# Patient Record
Sex: FEMALE | Race: BLACK OR AFRICAN AMERICAN | NOT HISPANIC OR LATINO | Employment: UNEMPLOYED | URBAN - METROPOLITAN AREA
[De-identification: names, ages, dates, MRNs, and addresses within clinical notes are randomized per-mention and may not be internally consistent; named-entity substitution may affect disease eponyms.]

---

## 2017-04-17 ENCOUNTER — HOSPITAL ENCOUNTER (EMERGENCY)
Facility: HOSPITAL | Age: 37
Discharge: PSYCHIATRIC OR SUBSTANCE USE FACILITY | End: 2017-04-18
Attending: EMERGENCY MEDICINE
Payer: MEDICAID

## 2017-04-17 DIAGNOSIS — N39.0 URINARY TRACT INFECTION WITHOUT HEMATURIA, SITE UNSPECIFIED: ICD-10-CM

## 2017-04-17 DIAGNOSIS — F29 PSYCHOSIS, UNSPECIFIED PSYCHOSIS TYPE: Primary | ICD-10-CM

## 2017-04-17 LAB
ALBUMIN SERPL BCP-MCNC: 4.4 G/DL
ALP SERPL-CCNC: 91 U/L
ALT SERPL W/O P-5'-P-CCNC: 10 U/L
AMORPH CRY URNS QL MICRO: ABNORMAL
AMPHET+METHAMPHET UR QL: NEGATIVE
ANION GAP SERPL CALC-SCNC: 16 MMOL/L
APAP SERPL-MCNC: <3 UG/ML
AST SERPL-CCNC: 14 U/L
B-HCG UR QL: NEGATIVE
BACTERIA #/AREA URNS HPF: ABNORMAL /HPF
BARBITURATES UR QL SCN>200 NG/ML: NEGATIVE
BASOPHILS # BLD AUTO: 0.01 K/UL
BASOPHILS NFR BLD: 0.1 %
BENZODIAZ UR QL SCN>200 NG/ML: NEGATIVE
BILIRUB SERPL-MCNC: 2.3 MG/DL
BILIRUB UR QL STRIP: NEGATIVE
BUN SERPL-MCNC: 7 MG/DL
BZE UR QL SCN: NEGATIVE
CALCIUM SERPL-MCNC: 10.2 MG/DL
CANNABINOIDS UR QL SCN: NORMAL
CHLORIDE SERPL-SCNC: 103 MMOL/L
CLARITY UR: ABNORMAL
CO2 SERPL-SCNC: 18 MMOL/L
COLOR UR: ABNORMAL
CREAT SERPL-MCNC: 0.9 MG/DL
CREAT UR-MCNC: 297.8 MG/DL
CTP QC/QA: YES
DIFFERENTIAL METHOD: ABNORMAL
EOSINOPHIL # BLD AUTO: 0 K/UL
EOSINOPHIL NFR BLD: 0 %
ERYTHROCYTE [DISTWIDTH] IN BLOOD BY AUTOMATED COUNT: 17.1 %
EST. GFR  (AFRICAN AMERICAN): >60 ML/MIN/1.73 M^2
EST. GFR  (NON AFRICAN AMERICAN): >60 ML/MIN/1.73 M^2
ETHANOL SERPL-MCNC: <10 MG/DL
GLUCOSE SERPL-MCNC: 141 MG/DL
GLUCOSE UR QL STRIP: NEGATIVE
GRAN CASTS #/AREA URNS LPF: 2 /LPF
HCT VFR BLD AUTO: 39.5 %
HGB BLD-MCNC: 13.9 G/DL
HGB UR QL STRIP: ABNORMAL
HYALINE CASTS #/AREA URNS LPF: 1 /LPF
KETONES UR QL STRIP: NEGATIVE
LEUKOCYTE ESTERASE UR QL STRIP: NEGATIVE
LYMPHOCYTES # BLD AUTO: 1.6 K/UL
LYMPHOCYTES NFR BLD: 12.7 %
MCH RBC QN AUTO: 32.8 PG
MCHC RBC AUTO-ENTMCNC: 35.2 %
MCV RBC AUTO: 93 FL
METHADONE UR QL SCN>300 NG/ML: NEGATIVE
MICROSCOPIC COMMENT: ABNORMAL
MONOCYTES # BLD AUTO: 1 K/UL
MONOCYTES NFR BLD: 8.2 %
NEUTROPHILS # BLD AUTO: 10 K/UL
NEUTROPHILS NFR BLD: 78.8 %
NITRITE UR QL STRIP: POSITIVE
OPIATES UR QL SCN: NEGATIVE
PCP UR QL SCN>25 NG/ML: NEGATIVE
PH UR STRIP: 6 [PH] (ref 5–8)
PLATELET # BLD AUTO: 359 K/UL
PMV BLD AUTO: 9.3 FL
POTASSIUM SERPL-SCNC: 4 MMOL/L
PROT SERPL-MCNC: 8.3 G/DL
PROT UR QL STRIP: ABNORMAL
RBC # BLD AUTO: 4.24 M/UL
RBC #/AREA URNS HPF: 10 /HPF (ref 0–4)
SALICYLATES SERPL-MCNC: <5 MG/DL
SODIUM SERPL-SCNC: 137 MMOL/L
SP GR UR STRIP: >=1.03 (ref 1–1.03)
TOXICOLOGY INFORMATION: NORMAL
URN SPEC COLLECT METH UR: ABNORMAL
UROBILINOGEN UR STRIP-ACNC: NEGATIVE EU/DL
WBC # BLD AUTO: 12.73 K/UL
WBC #/AREA URNS HPF: 5 /HPF (ref 0–5)

## 2017-04-17 PROCEDURE — 80320 DRUG SCREEN QUANTALCOHOLS: CPT

## 2017-04-17 PROCEDURE — 81000 URINALYSIS NONAUTO W/SCOPE: CPT

## 2017-04-17 PROCEDURE — 87088 URINE BACTERIA CULTURE: CPT

## 2017-04-17 PROCEDURE — 81025 URINE PREGNANCY TEST: CPT | Performed by: EMERGENCY MEDICINE

## 2017-04-17 PROCEDURE — 87086 URINE CULTURE/COLONY COUNT: CPT

## 2017-04-17 PROCEDURE — 85025 COMPLETE CBC W/AUTO DIFF WBC: CPT

## 2017-04-17 PROCEDURE — 96372 THER/PROPH/DIAG INJ SC/IM: CPT

## 2017-04-17 PROCEDURE — 99285 EMERGENCY DEPT VISIT HI MDM: CPT | Mod: 25

## 2017-04-17 PROCEDURE — 80329 ANALGESICS NON-OPIOID 1 OR 2: CPT

## 2017-04-17 PROCEDURE — 80307 DRUG TEST PRSMV CHEM ANLYZR: CPT

## 2017-04-17 PROCEDURE — 87186 SC STD MICRODIL/AGAR DIL: CPT

## 2017-04-17 PROCEDURE — 87077 CULTURE AEROBIC IDENTIFY: CPT

## 2017-04-17 PROCEDURE — 80053 COMPREHEN METABOLIC PANEL: CPT

## 2017-04-17 PROCEDURE — 80307 DRUG TEST PRSMV CHEM ANLYZR: CPT | Mod: 59

## 2017-04-17 PROCEDURE — 82570 ASSAY OF URINE CREATININE: CPT

## 2017-04-17 PROCEDURE — 63600175 PHARM REV CODE 636 W HCPCS: Performed by: EMERGENCY MEDICINE

## 2017-04-17 RX ORDER — DIPHENHYDRAMINE HYDROCHLORIDE 50 MG/ML
50 INJECTION INTRAMUSCULAR; INTRAVENOUS
Status: COMPLETED | OUTPATIENT
Start: 2017-04-17 | End: 2017-04-17

## 2017-04-17 RX ORDER — HALOPERIDOL 5 MG/ML
10 INJECTION INTRAMUSCULAR
Status: COMPLETED | OUTPATIENT
Start: 2017-04-17 | End: 2017-04-17

## 2017-04-17 RX ORDER — LORAZEPAM 2 MG/ML
2 INJECTION INTRAMUSCULAR
Status: COMPLETED | OUTPATIENT
Start: 2017-04-17 | End: 2017-04-17

## 2017-04-17 RX ADMIN — HALOPERIDOL LACTATE 10 MG: 5 INJECTION, SOLUTION INTRAMUSCULAR at 08:04

## 2017-04-17 RX ADMIN — LORAZEPAM 2 MG: 2 INJECTION, SOLUTION INTRAMUSCULAR; INTRAVENOUS at 08:04

## 2017-04-17 RX ADMIN — DIPHENHYDRAMINE HYDROCHLORIDE 50 MG: 50 INJECTION, SOLUTION INTRAMUSCULAR; INTRAVENOUS at 08:04

## 2017-04-18 VITALS
RESPIRATION RATE: 20 BRPM | BODY MASS INDEX: 23.9 KG/M2 | WEIGHT: 140 LBS | OXYGEN SATURATION: 98 % | SYSTOLIC BLOOD PRESSURE: 117 MMHG | HEIGHT: 64 IN | TEMPERATURE: 99 F | DIASTOLIC BLOOD PRESSURE: 72 MMHG | HEART RATE: 69 BPM

## 2017-04-18 RX ORDER — SULFAMETHOXAZOLE AND TRIMETHOPRIM 800; 160 MG/1; MG/1
1 TABLET ORAL 2 TIMES DAILY
Status: DISCONTINUED | OUTPATIENT
Start: 2017-04-18 | End: 2017-04-18 | Stop reason: HOSPADM

## 2017-04-18 NOTE — ED NOTES
Contacted [facility] to find out ETA.  is in route to  and transfer pt. to [facility].

## 2017-04-18 NOTE — ED NOTES
Called pt's mother, Nelly, to update family member on pt's psych placement into [facility] in [place]. No answer. Did not leave voicemail. Will attempt to contact family again in 30 minutes.

## 2017-04-18 NOTE — ED PROVIDER NOTES
"Encounter Date: 4/17/2017       History     Chief Complaint   Patient presents with    Mental Health Problem     reports "I'm going crazy" crying and screaming all day, reports "I was in mental hosptial before     Review of patient's allergies indicates:  No Known Allergies  HPI Comments: Patient is a 36-year-old female with a psychiatric history who presents to the ED saying she is going crazy has been screaming all day.  She denies illicit drug abuse.  No suicidal ideation.  Patient was recently treated as an inpatient in a psychiatric facility in [place].    The history is provided by the patient.     No past medical history on file.  Past Surgical History:   Procedure Laterality Date    BARTHOLIN GLAND CYST EXCISION       No family history on file.  Social History   Substance Use Topics    Smoking status: Current Some Day Smoker     Packs/day: 0.50     Types: Cigarettes    Smokeless tobacco: Not on file    Alcohol use 0.5 oz/week     1 drink(s) per week      Comment: now and then     Review of Systems   Constitutional: Negative for fever.   Respiratory: Negative for shortness of breath.    Gastrointestinal: Negative for abdominal pain and vomiting.   Psychiatric/Behavioral: Negative for hallucinations and suicidal ideas.   All other systems reviewed and are negative.      Physical Exam   Initial Vitals   BP Pulse Resp Temp SpO2   04/17/17 2029 04/17/17 2029 04/17/17 2029 04/17/17 2029 04/17/17 2029   138/69 113 20 98.7 °F (37.1 °C) 99 %     Physical Exam    Nursing note and vitals reviewed.  Constitutional: She appears distressed.   HENT:   Head: Normocephalic and atraumatic.   Eyes: EOM are normal.   Neck: Normal range of motion. Neck supple.   Cardiovascular: Normal rate, regular rhythm and normal heart sounds.   Pulmonary/Chest: Breath sounds normal.   Abdominal: Soft. There is no tenderness.   Musculoskeletal: Normal range of motion. She exhibits no edema.   Neurological: She is alert.   Skin: Skin " is warm and dry.   Psychiatric: Her mood appears anxious. She is agitated and aggressive.         ED Course   Procedures  Labs Reviewed   URINALYSIS - Abnormal; Notable for the following:        Result Value    Color, UA Orange (*)     Appearance, UA Hazy (*)     Specific Gravity, UA >=1.030 (*)     Protein, UA 2+ (*)     Occult Blood UA 3+ (*)     Nitrite, UA Positive (*)     All other components within normal limits   CBC W/ AUTO DIFFERENTIAL - Abnormal; Notable for the following:     WBC 12.73 (*)     MCH 32.8 (*)     RDW 17.1 (*)     Platelets 359 (*)     Gran # 10.0 (*)     Gran% 78.8 (*)     Lymph% 12.7 (*)     All other components within normal limits   COMPREHENSIVE METABOLIC PANEL - Abnormal; Notable for the following:     CO2 18 (*)     Glucose 141 (*)     Total Bilirubin 2.3 (*)     All other components within normal limits   ACETAMINOPHEN LEVEL - Abnormal; Notable for the following:     Acetaminophen (Tylenol), Serum <3.0 (*)     All other components within normal limits   SALICYLATE LEVEL - Abnormal; Notable for the following:     Salicylate Lvl <5.0 (*)     All other components within normal limits   URINALYSIS MICROSCOPIC - Abnormal; Notable for the following:     RBC, UA 10 (*)     Bacteria, UA Many (*)     Granular Casts, UA 2 (*)     All other components within normal limits   CULTURE, URINE   ALCOHOL,MEDICAL (ETHANOL)   DRUG SCREEN PANEL, URINE EMERGENCY   POCT URINE PREGNANCY                               ED Course     Clinical Impression:   The primary encounter diagnosis was Psychosis, unspecified psychosis type. A diagnosis of Urinary tract infection without hematuria, site unspecified was also pertinent to this visit.          Augie Ruvalcaba MD  04/18/17 3674

## 2017-04-18 NOTE — ED NOTES
"Pt. wheeled to ED room. Pt. presents to the ED saying she is going crazy and has been screaming all day. Previous psych treatment 6 months ago in [place]. Pt. screaming she wants to kill herself. "I'm going crazy". "help". Pt. appears fearful and paranoid. Mother reports pt. was hitting her head against the wall PTA. No obvious discoloration, bruising or swelling. Denies LOC. Denies HA and neck pain. AAO. Responding to internal stimuli. Admits to SI. Denies HI.   "

## 2017-04-18 NOTE — ED NOTES
Mother at bedside. Pt. calm, tearful, no longer yelling or disturbing the ED. 1-1 observation by ED tech Sharon. Pt. visible from nurses station. NAD.

## 2017-04-18 NOTE — ED NOTES
Pt wheeled directly to ED room. 1-1 observation assigned to pt. ED tech Sharon sitting with pt. Pt. placed in paper scrubs, socks and searched for harmful belongings. Belongings placed into pt. belonging bag by OC Yvette. Belongings locked in ED closet by LAURA Crawford.

## 2017-04-20 LAB — BACTERIA UR CULT: NORMAL

## 2017-05-05 NOTE — PROVIDER PROGRESS NOTES - EMERGENCY DEPT.
Encounter Date: 4/17/2017    ED Physician Progress Notes        Physician Note:   04/27/17 1837: Patient was not placed on ABX for UTI.  Call made to number listed with no answer.  EMMA        5/1/17 certified letter sent. NORIS

## 2017-11-13 ENCOUNTER — HOSPITAL ENCOUNTER (EMERGENCY)
Facility: HOSPITAL | Age: 37
Discharge: HOME | End: 2017-11-13
Attending: FAMILY MEDICINE
Payer: MEDICAID

## 2017-11-13 VITALS
SYSTOLIC BLOOD PRESSURE: 167 MMHG | HEART RATE: 87 BPM | RESPIRATION RATE: 18 BRPM | DIASTOLIC BLOOD PRESSURE: 92 MMHG | WEIGHT: 120 LBS | TEMPERATURE: 98 F | HEIGHT: 62 IN | BODY MASS INDEX: 22.08 KG/M2 | OXYGEN SATURATION: 100 %

## 2017-11-13 DIAGNOSIS — F41.9 ANXIETY: Primary | ICD-10-CM

## 2017-11-13 PROCEDURE — 99283 EMERGENCY DEPT VISIT LOW MDM: CPT

## 2017-11-13 RX ORDER — HYDROXYZINE HYDROCHLORIDE 25 MG/1
25 TABLET, FILM COATED ORAL EVERY 6 HOURS
Qty: 12 TABLET | Refills: 0 | Status: SHIPPED | OUTPATIENT
Start: 2017-11-13 | End: 2019-03-02

## 2017-11-14 NOTE — ED PROVIDER NOTES
Encounter Date: 2017       History     Chief Complaint   Patient presents with    Anxiety     Pt states has been anxious and upset since cousin  yesterday.  c/o + ETOH use today, denies SI/HI.      37-year-old female presents with chief complaint of anxiety.  Patient states is upset after her cousin  on yesterday.  Reports that she sometimes feels overwhelmed because her family in [facility] heavily any have been even more so after the death of her cousin.  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  Denies any suicidal homicidal thoughts.  States had trouble sleeping last night and would like something to help her sleep.  Does report has not been taking her anxiety medicine because she has not seen a counselor.  Does report just got on Medicaid again so desires to start seeing her counselor again.  Denies any fever or chills denies any nausea vomiting denies any shortness of breath at present.  Does admit to drinking 64 ounces of Hinojosa light this evening.          Review of patient's allergies indicates:  No Known Allergies  History reviewed. No pertinent past medical history.  Past Surgical History:   Procedure Laterality Date    BARTHOLIN GLAND CYST EXCISION       History reviewed. No pertinent family history.  Social History   Substance Use Topics    Smoking status: Current Some Day Smoker     Packs/day: 0.50     Types: Cigarettes    Smokeless tobacco: Not on file    Alcohol use 0.5 oz/week     1 drink(s) per week      Comment: now and then     Review of Systems   Psychiatric/Behavioral: Negative for agitation.   All other systems reviewed and are negative.      Physical Exam     Initial Vitals [17 1754]   BP Pulse Resp Temp SpO2   (!) 147/97 77 18 97.8 °F (36.6 °C) 100 %      MAP       113.67         Physical Exam    Nursing note and vitals reviewed.  Constitutional: She appears well-developed and well-nourished.   HENT:   Head:  Normocephalic and atraumatic.   Eyes: EOM are normal. Pupils are equal, round, and reactive to light.   Neck: Normal range of motion. Neck supple.   Cardiovascular: Normal rate, regular rhythm and normal heart sounds.   Pulmonary/Chest: Breath sounds normal.   Abdominal: Soft. Bowel sounds are normal.   Musculoskeletal: Normal range of motion.   Neurological: She is alert and oriented to person, place, and time.   Skin: Skin is warm. Capillary refill takes less than 2 seconds. There is erythema.   Psychiatric: Her behavior is normal. Her mood appears anxious. Her affect is not blunt and not labile. She does not exhibit a depressed mood. She expresses no homicidal and no suicidal ideation.         ED Course   Procedures  Labs Reviewed - No data to display                            ED Course      Clinical Impression:   The encounter diagnosis was Anxiety.                           Edward Cummings MD  11/13/17 8116

## 2018-11-17 ENCOUNTER — TELEMEDICINE (OUTPATIENT)
Dept: PSYCHIATRY | Facility: HOSPITAL | Age: 38
End: 2018-11-17

## 2018-11-17 ENCOUNTER — HOSPITAL ENCOUNTER (EMERGENCY)
Facility: HOSPITAL | Age: 38
Discharge: HOME | End: 2018-11-17
Attending: FAMILY MEDICINE
Payer: MEDICAID

## 2018-11-17 VITALS
HEIGHT: 63 IN | DIASTOLIC BLOOD PRESSURE: 88 MMHG | WEIGHT: 120 LBS | HEART RATE: 89 BPM | OXYGEN SATURATION: 100 % | SYSTOLIC BLOOD PRESSURE: 184 MMHG | TEMPERATURE: 98 F | RESPIRATION RATE: 20 BRPM | BODY MASS INDEX: 21.26 KG/M2

## 2018-11-17 DIAGNOSIS — R46.89 BEHAVIOR CONCERN: Primary | ICD-10-CM

## 2018-11-17 LAB
ALBUMIN SERPL BCP-MCNC: 4.5 G/DL
ALP SERPL-CCNC: 86 U/L
ALT SERPL W/O P-5'-P-CCNC: 19 U/L
AMPHET+METHAMPHET UR QL: NORMAL
ANION GAP SERPL CALC-SCNC: 10 MMOL/L
APAP SERPL-MCNC: <10 UG/ML
AST SERPL-CCNC: 31 U/L
B-HCG UR QL: NEGATIVE
BACTERIA #/AREA URNS AUTO: ABNORMAL /HPF
BARBITURATES UR QL SCN>200 NG/ML: NEGATIVE
BASOPHILS # BLD AUTO: 0.03 K/UL
BASOPHILS NFR BLD: 0.4 %
BENZODIAZ UR QL SCN>200 NG/ML: NEGATIVE
BILIRUB SERPL-MCNC: 2.4 MG/DL
BILIRUB UR QL STRIP: NEGATIVE
BUN SERPL-MCNC: 8 MG/DL
BZE UR QL SCN: NEGATIVE
CALCIUM SERPL-MCNC: 9.5 MG/DL
CANNABINOIDS UR QL SCN: NORMAL
CHLORIDE SERPL-SCNC: 103 MMOL/L
CLARITY UR REFRACT.AUTO: ABNORMAL
CO2 SERPL-SCNC: 22 MMOL/L
COLOR UR AUTO: ABNORMAL
CREAT SERPL-MCNC: 0.67 MG/DL
CREAT UR-MCNC: 324.8 MG/DL
DIFFERENTIAL METHOD: ABNORMAL
EOSINOPHIL # BLD AUTO: 0 K/UL
EOSINOPHIL NFR BLD: 0.1 %
ERYTHROCYTE [DISTWIDTH] IN BLOOD BY AUTOMATED COUNT: 15.9 %
EST. GFR  (AFRICAN AMERICAN): >60 ML/MIN/1.73 M^2
EST. GFR  (NON AFRICAN AMERICAN): >60 ML/MIN/1.73 M^2
ETHANOL SERPL-MCNC: <10 MG/DL
GLUCOSE SERPL-MCNC: 101 MG/DL
GLUCOSE UR QL STRIP: NEGATIVE
HCT VFR BLD AUTO: 37.4 %
HGB BLD-MCNC: 12.8 G/DL
HGB UR QL STRIP: ABNORMAL
HYALINE CASTS UR QL AUTO: 0 /LPF
KETONES UR QL STRIP: ABNORMAL
LEUKOCYTE ESTERASE UR QL STRIP: ABNORMAL
LYMPHOCYTES # BLD AUTO: 1.9 K/UL
LYMPHOCYTES NFR BLD: 26.5 %
MCH RBC QN AUTO: 31.7 PG
MCHC RBC AUTO-ENTMCNC: 34.2 G/DL
MCV RBC AUTO: 93 FL
METHADONE UR QL SCN>300 NG/ML: NEGATIVE
MICROSCOPIC COMMENT: ABNORMAL
MONOCYTES # BLD AUTO: 1.1 K/UL
MONOCYTES NFR BLD: 15.3 %
NEUTROPHILS # BLD AUTO: 4.2 K/UL
NEUTROPHILS NFR BLD: 57.6 %
NITRITE UR QL STRIP: NEGATIVE
OPIATES UR QL SCN: NEGATIVE
PCP UR QL SCN>25 NG/ML: NEGATIVE
PH UR STRIP: 8 [PH] (ref 5–8)
PLATELET # BLD AUTO: 305 K/UL
PMV BLD AUTO: 9.3 FL
POTASSIUM SERPL-SCNC: 3 MMOL/L
PROT SERPL-MCNC: 8 G/DL
PROT UR QL STRIP: ABNORMAL
RBC # BLD AUTO: 4.04 M/UL
RBC #/AREA URNS AUTO: 1 /HPF (ref 0–4)
SODIUM SERPL-SCNC: 135 MMOL/L
SP GR UR STRIP: 1.01 (ref 1–1.03)
TOXICOLOGY INFORMATION: NORMAL
TRI-PHOS CRY UR QL COMP ASSIST: ABNORMAL
URN SPEC COLLECT METH UR: ABNORMAL
UROBILINOGEN UR STRIP-ACNC: NEGATIVE EU/DL
WBC # BLD AUTO: 7.32 K/UL
WBC #/AREA URNS AUTO: 35 /HPF (ref 0–5)

## 2018-11-17 PROCEDURE — 87086 URINE CULTURE/COLONY COUNT: CPT

## 2018-11-17 PROCEDURE — 85025 COMPLETE CBC W/AUTO DIFF WBC: CPT

## 2018-11-17 PROCEDURE — 80329 ANALGESICS NON-OPIOID 1 OR 2: CPT

## 2018-11-17 PROCEDURE — 80320 DRUG SCREEN QUANTALCOHOLS: CPT

## 2018-11-17 PROCEDURE — 80053 COMPREHEN METABOLIC PANEL: CPT

## 2018-11-17 PROCEDURE — 25000003 PHARM REV CODE 250

## 2018-11-17 PROCEDURE — 99284 EMERGENCY DEPT VISIT MOD MDM: CPT

## 2018-11-17 PROCEDURE — 81000 URINALYSIS NONAUTO W/SCOPE: CPT | Mod: 59

## 2018-11-17 PROCEDURE — 80307 DRUG TEST PRSMV CHEM ANLYZR: CPT

## 2018-11-17 PROCEDURE — 25000003 PHARM REV CODE 250: Performed by: FAMILY MEDICINE

## 2018-11-17 PROCEDURE — 81025 URINE PREGNANCY TEST: CPT

## 2018-11-17 RX ORDER — POTASSIUM CHLORIDE 20 MEQ/1
40 TABLET, EXTENDED RELEASE ORAL
Status: COMPLETED | OUTPATIENT
Start: 2018-11-17 | End: 2018-11-17

## 2018-11-17 RX ORDER — RISPERIDONE 1 MG/1
TABLET ORAL
Status: COMPLETED
Start: 2018-11-17 | End: 2018-11-17

## 2018-11-17 RX ORDER — RISPERIDONE 1 MG/1
1 TABLET, ORALLY DISINTEGRATING ORAL
Status: DISCONTINUED | OUTPATIENT
Start: 2018-11-17 | End: 2018-11-17 | Stop reason: HOSPADM

## 2018-11-17 RX ORDER — RISPERIDONE 1 MG/1
1 TABLET ORAL 2 TIMES DAILY
Qty: 60 TABLET | Refills: 0 | Status: SHIPPED | OUTPATIENT
Start: 2018-11-17 | End: 2018-12-17

## 2018-11-17 RX ORDER — NITROFURANTOIN 25; 75 MG/1; MG/1
100 CAPSULE ORAL
Status: COMPLETED | OUTPATIENT
Start: 2018-11-17 | End: 2018-11-17

## 2018-11-17 RX ADMIN — NITROFURANTOIN (MONOHYDRATE/MACROCRYSTALS) 100 MG: 75; 25 CAPSULE ORAL at 08:11

## 2018-11-17 RX ADMIN — POTASSIUM CHLORIDE 40 MEQ: 20 TABLET, EXTENDED RELEASE ORAL at 08:11

## 2018-11-17 RX ADMIN — RISPERIDONE 1 MG: 1 TABLET ORAL at 08:11

## 2018-11-18 NOTE — ED PROVIDER NOTES
"Encounter Date: 11/17/2018       History     Chief Complaint   Patient presents with    Psychiatric Evaluation     pt was brought in by [facility] Police dept. Her sister called stating " My sister has not been taking her medications, she is hearing voices at the house she things some one is trying to get out of the atic and she hears a baby crying up there so today she ran down the street and asked a neibor to come and get them out" Pt denies all of this, she stated " i used to be on prescribed medications but not anymore."     38-year-old female patient comes in with complaint by the police department stating that she was a hearing voices.  Apparently patient states that she heard a child cry and was under the impression that was coming from her attic so she went and got a neighbor task to check patient states that she was not necessarily hearing voices that she thinks it could have even been a child outside but she states that her sister was concerned so the sister told the police that she hears voices and has hallucinations.  The patient herself call the police to come the checked the attic.  She is very cooperative soft-spoken does have some random flight of thoughts.          Review of patient's allergies indicates:  No Known Allergies  History reviewed. No pertinent past medical history.  Past Surgical History:   Procedure Laterality Date    BARTHOLIN GLAND CYST EXCISION       History reviewed. No pertinent family history.  Social History     Tobacco Use    Smoking status: Current Some Day Smoker     Packs/day: 0.50     Types: Cigarettes   Substance Use Topics    Alcohol use: Yes     Alcohol/week: 0.5 oz     Types: 1 Standard drinks or equivalent per week     Comment: drink about 4/5 cans a week    Drug use: No     Review of Systems   Constitutional: Negative for fever.   HENT: Negative for sore throat.    Respiratory: Negative for shortness of breath.    Cardiovascular: Negative for chest pain. "   Gastrointestinal: Negative for nausea.   Genitourinary: Negative for dysuria.   Musculoskeletal: Negative for back pain.   Skin: Negative for rash.   Neurological: Negative for weakness.   Hematological: Does not bruise/bleed easily.   Psychiatric/Behavioral: Positive for behavioral problems.   All other systems reviewed and are negative.      Physical Exam     Initial Vitals [11/17/18 1803]   BP Pulse Resp Temp SpO2   (!) 145/85 97 20 98.6 °F (37 °C) 100 %      MAP       --         Physical Exam    Nursing note and vitals reviewed.  Constitutional: She appears well-developed.   HENT:   Head: Normocephalic and atraumatic.   Right Ear: External ear normal.   Left Ear: External ear normal.   Nose: Nose normal.   Mouth/Throat: Oropharynx is clear and moist.   Eyes: Conjunctivae and EOM are normal. Pupils are equal, round, and reactive to light. Right eye exhibits no discharge. Left eye exhibits no discharge.   Neck: Normal range of motion. Neck supple. No tracheal deviation present.   Cardiovascular: Normal rate, regular rhythm and normal heart sounds.   No murmur heard.  Pulmonary/Chest: Breath sounds normal. No respiratory distress. She has no wheezes.   Abdominal: Soft. Bowel sounds are normal.   Neurological: She is alert and oriented to person, place, and time.   Skin: Skin is warm and dry.   Psychiatric:   No suicidal or homicidal thoughts         ED Course   Procedures  Labs Reviewed   CBC W/ AUTO DIFFERENTIAL - Abnormal; Notable for the following components:       Result Value    MCH 31.7 (*)     RDW 15.9 (*)     Mono # 1.1 (*)     Mono% 15.3 (*)     All other components within normal limits   COMPREHENSIVE METABOLIC PANEL - Abnormal; Notable for the following components:    Sodium 135 (*)     Potassium 3.0 (*)     CO2 22 (*)     Total Bilirubin 2.4 (*)     All other components within normal limits   URINALYSIS, REFLEX TO URINE CULTURE - Abnormal; Notable for the following components:    Color, UA Brown (*)      Appearance, UA Cloudy (*)     Protein, UA 1+ (*)     Ketones, UA 3+ (*)     Occult Blood UA Trace (*)     Leukocytes, UA 3+ (*)     All other components within normal limits    Narrative:     Preferred Collection Type->Urine, Clean Catch   URINALYSIS MICROSCOPIC - Abnormal; Notable for the following components:    WBC, UA 35 (*)     All other components within normal limits    Narrative:     Preferred Collection Type->Urine, Clean Catch   CULTURE, URINE   DRUG SCREEN PANEL, URINE EMERGENCY   ALCOHOL,MEDICAL (ETHANOL)   ACETAMINOPHEN LEVEL   PREGNANCY TEST, URINE RAPID          Imaging Results    None          Medical Decision Making:   Initial Assessment:   Patient sitting in no distress and pleasant. Patient has no other complaints other than documented.     Differential Diagnosis:   Suicide ideation  Schizophrenia  Depression  anxiety  '  ED Management:  Patient has a urinary tract infection which was addressed and 1st treatment given here in the ED otherwise patient is medically cleared for inpatient psychiatric treatment.                      Clinical Impression:   The encounter diagnosis was Behavior concern.                             Antonio Brown MD  11/22/18 4571
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
## 2018-11-20 LAB
BACTERIA UR CULT: NORMAL
BACTERIA UR CULT: NORMAL

## 2018-11-22 ENCOUNTER — HOSPITAL ENCOUNTER (EMERGENCY)
Facility: HOSPITAL | Age: 38
Discharge: HOME | End: 2018-11-22
Attending: FAMILY MEDICINE
Payer: MEDICAID

## 2018-11-22 VITALS
OXYGEN SATURATION: 99 % | SYSTOLIC BLOOD PRESSURE: 139 MMHG | TEMPERATURE: 99 F | BODY MASS INDEX: 23 KG/M2 | WEIGHT: 125 LBS | HEIGHT: 62 IN | RESPIRATION RATE: 20 BRPM | HEART RATE: 109 BPM | DIASTOLIC BLOOD PRESSURE: 82 MMHG

## 2018-11-22 DIAGNOSIS — K08.89 PAIN, DENTAL: Primary | ICD-10-CM

## 2018-11-22 PROCEDURE — 63600175 PHARM REV CODE 636 W HCPCS: Performed by: FAMILY MEDICINE

## 2018-11-22 PROCEDURE — 99284 EMERGENCY DEPT VISIT MOD MDM: CPT | Mod: 25

## 2018-11-22 PROCEDURE — 96372 THER/PROPH/DIAG INJ SC/IM: CPT

## 2018-11-22 RX ORDER — KETOROLAC TROMETHAMINE 30 MG/ML
60 INJECTION, SOLUTION INTRAMUSCULAR; INTRAVENOUS
Status: COMPLETED | OUTPATIENT
Start: 2018-11-22 | End: 2018-11-22

## 2018-11-22 RX ORDER — AMOXICILLIN 500 MG/1
500 CAPSULE ORAL 3 TIMES DAILY
Qty: 21 CAPSULE | Refills: 0 | Status: SHIPPED | OUTPATIENT
Start: 2018-11-22 | End: 2018-11-29

## 2018-11-22 RX ORDER — DICLOFENAC SODIUM 50 MG/1
50 TABLET, DELAYED RELEASE ORAL 2 TIMES DAILY
Qty: 21 TABLET | Refills: 0 | Status: SHIPPED | OUTPATIENT
Start: 2018-11-22 | End: 2019-03-02

## 2018-11-22 RX ADMIN — KETOROLAC TROMETHAMINE 60 MG: 30 INJECTION, SOLUTION INTRAMUSCULAR at 10:11

## 2018-11-22 NOTE — ED PROVIDER NOTES
Encounter Date: 11/22/2018       History     Chief Complaint   Patient presents with    Dental Pain     Pt c/o dental pain to left side of face since yesterday, states + swelling to left side of face.      38-year-old female comes in with complaint of right-sided lower dental pain had the same problem month ago was seen in urgent care but did not follow up with the dentist otherwise patient has some cheek swelling consistent with a dental abscess.          Review of patient's allergies indicates:  No Known Allergies  History reviewed. No pertinent past medical history.  Past Surgical History:   Procedure Laterality Date    BARTHOLIN GLAND CYST EXCISION       History reviewed. No pertinent family history.  Social History     Tobacco Use    Smoking status: Current Some Day Smoker     Packs/day: 0.50     Types: Cigarettes   Substance Use Topics    Alcohol use: Yes     Alcohol/week: 0.5 oz     Types: 1 Standard drinks or equivalent per week     Comment: drink about 4/5 cans a week    Drug use: No     Review of Systems   Constitutional: Negative for fever.   HENT: Positive for dental problem. Negative for sore throat.    Respiratory: Negative for shortness of breath.    Cardiovascular: Negative for chest pain.   Gastrointestinal: Negative for nausea.   Genitourinary: Negative for dysuria.   Musculoskeletal: Negative for back pain.   Skin: Negative for rash.   Neurological: Negative for weakness.   Hematological: Does not bruise/bleed easily.   All other systems reviewed and are negative.      Physical Exam     Initial Vitals [11/22/18 0932]   BP Pulse Resp Temp SpO2   139/82 109 20 99 °F (37.2 °C) 99 %      MAP       --         Physical Exam    Nursing note and vitals reviewed.  Constitutional: She appears well-developed.   HENT:   Head: Normocephalic and atraumatic.   Right Ear: External ear normal.   Left Ear: External ear normal.   Nose: Nose normal.   Mouth/Throat: Oropharynx is clear and moist.   Eyes:  Conjunctivae and EOM are normal. Pupils are equal, round, and reactive to light. Right eye exhibits no discharge. Left eye exhibits no discharge.   Neck: Normal range of motion. Neck supple. No tracheal deviation present.   Cardiovascular: Normal rate, regular rhythm and normal heart sounds.   No murmur heard.  Pulmonary/Chest: Breath sounds normal. No respiratory distress. She has no wheezes.   Abdominal: Soft. Bowel sounds are normal.   Neurological: She is alert and oriented to person, place, and time.   Skin: Skin is warm and dry.         ED Course   Procedures  Labs Reviewed - No data to display       Imaging Results    None          Medical Decision Making:   Initial Assessment:   Patient in moderate distress and no other complains    Differential Diagnosis:   Tooth abscess  Tooth carries  Infection  Gum disease                         Clinical Impression:   The encounter diagnosis was Pain, dental.                             Antonio Brown MD  11/22/18 8126

## 2019-03-02 ENCOUNTER — HOSPITAL ENCOUNTER (EMERGENCY)
Facility: HOSPITAL | Age: 39
Discharge: HOME | End: 2019-03-02
Attending: EMERGENCY MEDICINE
Payer: MEDICAID

## 2019-03-02 VITALS
WEIGHT: 130 LBS | HEART RATE: 109 BPM | SYSTOLIC BLOOD PRESSURE: 124 MMHG | TEMPERATURE: 99 F | HEIGHT: 62 IN | RESPIRATION RATE: 18 BRPM | BODY MASS INDEX: 23.92 KG/M2 | DIASTOLIC BLOOD PRESSURE: 67 MMHG | OXYGEN SATURATION: 100 %

## 2019-03-02 DIAGNOSIS — Z34.91 PREGNANT AND NOT YET DELIVERED IN FIRST TRIMESTER: ICD-10-CM

## 2019-03-02 DIAGNOSIS — L02.91 ABSCESS: Primary | ICD-10-CM

## 2019-03-02 LAB — B-HCG UR QL: POSITIVE

## 2019-03-02 PROCEDURE — 10060 I&D ABSCESS SIMPLE/SINGLE: CPT | Mod: ER

## 2019-03-02 PROCEDURE — 81025 URINE PREGNANCY TEST: CPT | Mod: ER

## 2019-03-02 PROCEDURE — 99283 EMERGENCY DEPT VISIT LOW MDM: CPT | Mod: 25,ER

## 2019-03-02 PROCEDURE — 25000003 PHARM REV CODE 250: Mod: ER | Performed by: PHYSICIAN ASSISTANT

## 2019-03-02 RX ORDER — LIDOCAINE HYDROCHLORIDE 10 MG/ML
5 INJECTION, SOLUTION EPIDURAL; INFILTRATION; INTRACAUDAL; PERINEURAL
Status: COMPLETED | OUTPATIENT
Start: 2019-03-02 | End: 2019-03-02

## 2019-03-02 RX ORDER — CEPHALEXIN 500 MG/1
500 CAPSULE ORAL 4 TIMES DAILY
Qty: 28 CAPSULE | Refills: 0 | Status: SHIPPED | OUTPATIENT
Start: 2019-03-02 | End: 2019-03-09

## 2019-03-02 RX ADMIN — LIDOCAINE HYDROCHLORIDE 50 MG: 10 INJECTION, SOLUTION EPIDURAL; INFILTRATION; INTRACAUDAL; PERINEURAL at 09:03

## 2019-03-03 NOTE — ED PROVIDER NOTES
Encounter Date: 3/2/2019       History     Chief Complaint   Patient presents with    Abscess     Pt with c/o abscess to L lower lower x2 days that got progressively worse. Pt denies fever.     Pt with c/o abscess to L lower leg x2 days getting progressively worse. Pt denies fever.             Review of patient's allergies indicates:  No Known Allergies  History reviewed. No pertinent past medical history.  Past Surgical History:   Procedure Laterality Date    BARTHOLIN GLAND CYST EXCISION       History reviewed. No pertinent family history.  Social History     Tobacco Use    Smoking status: Current Some Day Smoker     Packs/day: 0.50     Types: Cigarettes    Smokeless tobacco: Current User   Substance Use Topics    Alcohol use: Yes     Alcohol/week: 0.5 oz     Types: 1 Standard drinks or equivalent per week     Comment: drink about 4/5 cans a week    Drug use: No     Review of Systems   Constitutional: Negative for diaphoresis, fatigue and fever.        14 Point ROS completed and negative with the exception of HPI and Below.   HENT: Negative for congestion, ear pain and sore throat.    Eyes: Negative for discharge and itching.   Respiratory: Negative for cough, chest tightness, shortness of breath and wheezing.    Cardiovascular: Negative for chest pain, palpitations and leg swelling.   Gastrointestinal: Negative for abdominal distention, abdominal pain, nausea and vomiting.   Genitourinary: Negative for dysuria, flank pain and frequency.   Musculoskeletal: Negative for back pain, neck pain and neck stiffness.   Skin: Negative for pallor and rash.        Cellulitis and abscess left lower extremity   Neurological: Negative for dizziness, syncope, facial asymmetry, weakness and headaches.   Hematological: Does not bruise/bleed easily.   Psychiatric/Behavioral: Negative for agitation, behavioral problems and confusion.   All other systems reviewed and are negative.      Physical Exam     Initial Vitals [03/02/19  2122]   BP Pulse Resp Temp SpO2   124/67 109 18 98.9 °F (37.2 °C) 100 %      MAP       --         Physical Exam    Constitutional: She appears well-developed and well-nourished.   HENT:   Head: Normocephalic and atraumatic.   Right Ear: External ear normal.   Left Ear: External ear normal.   Nose: Nose normal.   Mouth/Throat: Oropharynx is clear and moist.   Eyes: Conjunctivae and EOM are normal. Pupils are equal, round, and reactive to light. Right eye exhibits no discharge. Left eye exhibits no discharge. No scleral icterus.   Neck: Normal range of motion. Neck supple. No thyromegaly present. No tracheal deviation present. No JVD present.   Cardiovascular: Normal rate, regular rhythm, normal heart sounds and intact distal pulses. Exam reveals no gallop and no friction rub.    No murmur heard.  Pulmonary/Chest: Breath sounds normal. No stridor. No respiratory distress. She has no wheezes. She has no rhonchi. She has no rales. She exhibits no tenderness.   Abdominal: Soft. Bowel sounds are normal. She exhibits no distension and no mass. There is no tenderness. There is no rebound and no guarding.   Musculoskeletal: Normal range of motion. She exhibits no edema or tenderness.   Lymphadenopathy:     She has no cervical adenopathy.   Neurological: She is alert and oriented to person, place, and time. She has normal strength and normal reflexes. She displays normal reflexes. No cranial nerve deficit or sensory deficit.   Skin: Skin is warm and dry. Capillary refill takes less than 2 seconds. Abscess noted. No rash noted. There is erythema. No pallor.        On exam the left lower leg has a 2 in area raised fluctuant erythema abscess tender to palpation   Psychiatric: She has a normal mood and affect. Her behavior is normal. Thought content normal.         ED Course   I & D - Incision and Drainage  Date/Time: 3/2/2019 9:52 PM  Performed by: Hasmukh Peterson PA-C  Authorized by: Bari Humphries MD   Pre-operative  "diagnosis: Abscess  Post-operative diagnosis: Abscess  Consent Done: Yes  Consent: Verbal consent obtained.  Risks and benefits: risks, benefits and alternatives were discussed  Consent given by: patient  Patient understanding: patient states understanding of the procedure being performed  Patient consent: the patient's understanding of the procedure matches consent given  Procedure consent: procedure consent matches procedure scheduled  Relevant documents: relevant documents present and verified  Test results: test results available and properly labeled  Site marked: the operative site was marked  Required items: required blood products, implants, devices, and special equipment available  Patient identity confirmed: , MRN, name, provided demographic data and verbally with patient  Time out: Immediately prior to procedure a "time out" was called to verify the correct patient, procedure, equipment, support staff and site/side marked as required.  Type: abscess  Body area: lower extremity  Location details: left leg  Anesthesia: local infiltration    Anesthesia:  Local Anesthetic: lidocaine 1% without epinephrine  Anesthetic total: 5 mL  Patient sedated: no  Description of findings: Serosanguineous,  discharge obtained   Incision type: single straight  Complexity: simple  Drainage: pus and  serosanguinous  Drainage amount: moderate  Wound treatment: incision and  wound packed  Packing material: 1/4 in gauze  Complications: No  Estimated blood loss (mL): 10  Specimens: No  Implants: No  Patient tolerance: Patient tolerated the procedure well with no immediate complications        Labs Reviewed - No data to display       Imaging Results    None          Medical Decision Making:   Initial Assessment:   Pt with c/o abscess to L lower leg x2 days getting progressively worse. Pt denies fever.  On exam the left lower leg has a 2 in area raised fluctuant erythema abscess tender to palpation  Differential Diagnosis: "   Abscess, cellulitis, MRSA                      Clinical Impression:       ICD-10-CM ICD-9-CM   1. Abscess L02.91 682.9   2. Pregnant and not yet delivered in first trimester Z34.91 V22.1                                Hasmukh Peterson PA-C  03/02/19 2200

## 2019-03-03 NOTE — DISCHARGE INSTRUCTIONS
Patient states she already has an appointment with her OB for a checkup.  She is to keep this appointment.  Continue all antibiotics and return to the ED for packing removal as instructed.

## 2019-10-07 ENCOUNTER — HOSPITAL ENCOUNTER (EMERGENCY)
Facility: HOSPITAL | Age: 39
Discharge: HOME | End: 2019-10-07
Attending: EMERGENCY MEDICINE
Payer: MEDICAID

## 2019-10-07 VITALS
HEIGHT: 64 IN | SYSTOLIC BLOOD PRESSURE: 137 MMHG | OXYGEN SATURATION: 99 % | BODY MASS INDEX: 23.05 KG/M2 | TEMPERATURE: 99 F | RESPIRATION RATE: 16 BRPM | DIASTOLIC BLOOD PRESSURE: 83 MMHG | HEART RATE: 81 BPM | WEIGHT: 135 LBS

## 2019-10-07 DIAGNOSIS — T16.1XXA FOREIGN BODY OF RIGHT EAR, INITIAL ENCOUNTER: Primary | ICD-10-CM

## 2019-10-07 DIAGNOSIS — H60.91 OTITIS EXTERNA OF RIGHT EAR, UNSPECIFIED CHRONICITY, UNSPECIFIED TYPE: ICD-10-CM

## 2019-10-07 PROCEDURE — 69200 CLEAR OUTER EAR CANAL: CPT | Mod: RT,ER

## 2019-10-07 PROCEDURE — 99283 EMERGENCY DEPT VISIT LOW MDM: CPT | Mod: 25,ER

## 2019-10-07 RX ORDER — NEOMYCIN SULFATE, POLYMYXIN B SULFATE AND HYDROCORTISONE 10; 3.5; 1 MG/ML; MG/ML; [USP'U]/ML
4 SUSPENSION/ DROPS AURICULAR (OTIC) 4 TIMES DAILY
Qty: 10 ML | Refills: 0 | Status: SHIPPED | OUTPATIENT
Start: 2019-10-07 | End: 2019-10-12

## 2019-10-08 NOTE — ED PROVIDER NOTES
Encounter Date: 10/7/2019       History     Chief Complaint   Patient presents with    Foreign Body in Ear     PT stated she has had a piece of Q-tip in right ear x2 weeks. No drainage coming from ear.      This patient is a 39-year-old female.  Presents to the emergency department complaining of a foreign body in the right ear canal, said that a Q-tip cotton in came off about 2 weeks ago.  It was 2 days before she went into the hospital to deliver her child.  She said that while she was in the hospital she told her obstetrician, but she said that no one took the piece of cotton out.  She is complaining of discomfort in the ear.  No fevers.  No drainage. Not diabetic.        Review of patient's allergies indicates:  No Known Allergies  No past medical history on file.  Past Surgical History:   Procedure Laterality Date    BARTHOLIN GLAND CYST EXCISION       No family history on file.  Social History     Tobacco Use    Smoking status: Current Some Day Smoker     Packs/day: 0.50     Types: Cigarettes    Smokeless tobacco: Current User   Substance Use Topics    Alcohol use: Yes     Alcohol/week: 0.8 standard drinks     Types: 1 Standard drinks or equivalent per week     Comment: drink about 4/5 cans a week    Drug use: No     Review of Systems   Constitutional: Negative.    HENT: Negative for ear discharge.    Respiratory: Negative.    Cardiovascular: Negative.    Gastrointestinal: Negative.    Endocrine: Negative.        Physical Exam     Initial Vitals [10/07/19 2214]   BP Pulse Resp Temp SpO2   137/83 81 16 98.5 °F (36.9 °C) 99 %      MAP       --         Physical Exam    Nursing note and vitals reviewed.  Constitutional: She appears well-developed and well-nourished. She is not diaphoretic. No distress.   HENT:   Head: Normocephalic.   Left Ear: External ear normal.   Mouth/Throat: Oropharynx is clear and moist.   Right external auditory canal has a piece of cotton Q-tip in the canal.  I removed it with a  alligator forceps, and re-examined the ear, tympanic membrane is intact.  There is some erythema of the posterior margin of the canal, looks like a small abrasion.  The patient said that she tried to remove the cotton tip with a forceps, so she may have abraded the ear canal.  There is no bleeding.   Neck: No JVD present.   Cardiovascular: Normal rate, regular rhythm, normal heart sounds and intact distal pulses.   Pulmonary/Chest: Breath sounds normal. No stridor. No respiratory distress.   Skin: Skin is warm and dry.   Psychiatric: She has a normal mood and affect.         ED Course   Foreign Body  Date/Time: 10/7/2019 10:36 PM  Performed by: Niall Harmon MD  Authorized by: Niall Harmon MD   Consent Done: Not Needed  Body area: ear    Anesthesia:  Local anesthesia used: no  Patient sedated: no  Localization method: visualized  Removal mechanism: alligator forceps  Complexity: simple  1 objects recovered.  Objects recovered: Cotton tip  Post-procedure assessment: foreign body removed  Patient tolerance: Patient tolerated the procedure well with no immediate complications      Labs Reviewed - No data to display       Imaging Results    None          Medical Decision Making:   Initial Assessment:   Foreign body, removed from right external auditory canal.  She has some erythema and inflammation of the posterior margin of the auditory canal, so I will place her on Cortisporin otic suspension for the next 5 days.  She should follow up with her primary care physician.                      Clinical Impression:       ICD-10-CM ICD-9-CM   1. Foreign body of right ear, initial encounter T16.1XXA 931     E915   2. Otitis externa of right ear, unspecified chronicity, unspecified type H60.91 380.10         Disposition:   Disposition: Discharged  Condition: Stable                        Niall Harmon MD  10/07/19 0929

## 2020-06-19 ENCOUNTER — HOSPITAL ENCOUNTER (OUTPATIENT)
Facility: HOSPITAL | Age: 40
Discharge: PSYCHIATRIC OR SUBSTANCE USE FACILITY | End: 2020-06-21
Attending: EMERGENCY MEDICINE | Admitting: INTERNAL MEDICINE
Payer: MEDICAID

## 2020-06-19 DIAGNOSIS — N30.00 ACUTE CYSTITIS WITHOUT HEMATURIA: ICD-10-CM

## 2020-06-19 DIAGNOSIS — Z00.8 MEDICAL CLEARANCE FOR PSYCHIATRIC ADMISSION: ICD-10-CM

## 2020-06-19 DIAGNOSIS — T14.91XA SUICIDE ATTEMPT: ICD-10-CM

## 2020-06-19 DIAGNOSIS — F32.3 CURRENT SEVERE EPISODE OF MAJOR DEPRESSIVE DISORDER WITH PSYCHOTIC FEATURES, UNSPECIFIED WHETHER RECURRENT: ICD-10-CM

## 2020-06-19 DIAGNOSIS — J69.0 ASPIRATION PNEUMONIA OF RIGHT LOWER LOBE, UNSPECIFIED ASPIRATION PNEUMONIA TYPE: ICD-10-CM

## 2020-06-19 DIAGNOSIS — F23 ACUTE PSYCHOSIS: ICD-10-CM

## 2020-06-19 DIAGNOSIS — E87.6 HYPOKALEMIA: ICD-10-CM
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
## 2020-06-19 DIAGNOSIS — J98.2 PNEUMOMEDIASTINUM: ICD-10-CM

## 2020-06-19 DIAGNOSIS — F31.9 BIPOLAR AFFECTIVE DISORDER, REMISSION STATUS UNSPECIFIED: ICD-10-CM

## 2020-06-19 DIAGNOSIS — J69.0 ASPIRATION PNEUMONITIS: ICD-10-CM

## 2020-06-19 DIAGNOSIS — S19.81XA: ICD-10-CM

## 2020-06-19 DIAGNOSIS — J38.4 SUPRAGLOTTIC EDEMA: ICD-10-CM

## 2020-06-19 LAB
BASOPHILS # BLD AUTO: 0.03 K/UL (ref 0–0.2)
BASOPHILS NFR BLD: 0.2 % (ref 0–1.9)
DIFFERENTIAL METHOD: ABNORMAL
EOSINOPHIL # BLD AUTO: 0 K/UL (ref 0–0.5)
EOSINOPHIL NFR BLD: 0 % (ref 0–8)
ERYTHROCYTE [DISTWIDTH] IN BLOOD BY AUTOMATED COUNT: 13.6 % (ref 11.5–14.5)
HCT VFR BLD AUTO: 40.9 % (ref 37–48.5)
HGB BLD-MCNC: 14.5 G/DL (ref 12–16)
IMM GRANULOCYTES # BLD AUTO: 0.06 K/UL (ref 0–0.04)
IMM GRANULOCYTES NFR BLD AUTO: 0.4 % (ref 0–0.5)
LYMPHOCYTES # BLD AUTO: 1.4 K/UL (ref 1–4.8)
LYMPHOCYTES NFR BLD: 8.5 % (ref 18–48)
MCH RBC QN AUTO: 34.5 PG (ref 27–31)
MCHC RBC AUTO-ENTMCNC: 35.5 G/DL (ref 32–36)
MCV RBC AUTO: 97 FL (ref 82–98)
MONOCYTES # BLD AUTO: 1.2 K/UL (ref 0.3–1)
MONOCYTES NFR BLD: 7.5 % (ref 4–15)
NEUTROPHILS # BLD AUTO: 13.5 K/UL (ref 1.8–7.7)
NEUTROPHILS NFR BLD: 83.4 % (ref 38–73)
NRBC BLD-RTO: 0 /100 WBC
PLATELET # BLD AUTO: 317 K/UL (ref 150–350)
PMV BLD AUTO: 9 FL (ref 9.2–12.9)
RBC # BLD AUTO: 4.2 M/UL (ref 4–5.4)
WBC # BLD AUTO: 16.21 K/UL (ref 3.9–12.7)

## 2020-06-19 PROCEDURE — 96372 THER/PROPH/DIAG INJ SC/IM: CPT | Mod: 59

## 2020-06-19 PROCEDURE — 80320 DRUG SCREEN QUANTALCOHOLS: CPT

## 2020-06-19 PROCEDURE — 80329 ANALGESICS NON-OPIOID 1 OR 2: CPT

## 2020-06-19 PROCEDURE — 80307 DRUG TEST PRSMV CHEM ANLYZR: CPT

## 2020-06-19 PROCEDURE — 80053 COMPREHEN METABOLIC PANEL: CPT

## 2020-06-19 PROCEDURE — 81025 URINE PREGNANCY TEST: CPT | Performed by: EMERGENCY MEDICINE

## 2020-06-19 PROCEDURE — 63600175 PHARM REV CODE 636 W HCPCS: Performed by: EMERGENCY MEDICINE

## 2020-06-19 PROCEDURE — 87086 URINE CULTURE/COLONY COUNT: CPT

## 2020-06-19 PROCEDURE — 81000 URINALYSIS NONAUTO W/SCOPE: CPT | Mod: 59

## 2020-06-19 PROCEDURE — 84443 ASSAY THYROID STIM HORMONE: CPT

## 2020-06-19 PROCEDURE — U0002 COVID-19 LAB TEST NON-CDC: HCPCS

## 2020-06-19 PROCEDURE — 99291 CRITICAL CARE FIRST HOUR: CPT | Mod: 25

## 2020-06-19 PROCEDURE — 85025 COMPLETE CBC W/AUTO DIFF WBC: CPT

## 2020-06-19 RX ORDER — DIPHENHYDRAMINE HYDROCHLORIDE 50 MG/ML
50 INJECTION INTRAMUSCULAR; INTRAVENOUS
Status: COMPLETED | OUTPATIENT
Start: 2020-06-19 | End: 2020-06-19

## 2020-06-19 RX ORDER — HALOPERIDOL 5 MG/ML
5 INJECTION INTRAMUSCULAR
Status: COMPLETED | OUTPATIENT
Start: 2020-06-19 | End: 2020-06-19

## 2020-06-19 RX ADMIN — LORAZEPAM 2 MG: 2 INJECTION INTRAMUSCULAR; INTRAVENOUS at 11:06

## 2020-06-19 RX ADMIN — DIPHENHYDRAMINE HYDROCHLORIDE 50 MG: 50 INJECTION, SOLUTION INTRAMUSCULAR; INTRAVENOUS at 11:06

## 2020-06-19 RX ADMIN — HALOPERIDOL LACTATE 5 MG: 5 INJECTION, SOLUTION INTRAMUSCULAR at 11:06

## 2020-06-20 PROBLEM — N39.0 UTI (URINARY TRACT INFECTION): Status: ACTIVE | Noted: 2020-06-20

## 2020-06-20 PROBLEM — J38.4 SUPRAGLOTTIC EDEMA: Status: ACTIVE | Noted: 2020-06-20

## 2020-06-20 PROBLEM — S19.81XA: Status: ACTIVE | Noted: 2020-06-20
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
## 2020-06-20 PROBLEM — J69.0 ASPIRATION PNEUMONIA: Status: ACTIVE | Noted: 2020-06-20

## 2020-06-20 PROBLEM — E87.6 HYPOKALEMIA: Status: ACTIVE | Noted: 2020-06-20

## 2020-06-20 PROBLEM — F31.9 BIPOLAR DISORDER: Status: ACTIVE | Noted: 2020-06-20

## 2020-06-20 PROBLEM — J98.2 PNEUMOMEDIASTINUM: Status: ACTIVE | Noted: 2020-06-20

## 2020-06-20 PROBLEM — T14.91XA SUICIDE ATTEMPT: Status: ACTIVE | Noted: 2020-06-20

## 2020-06-20 LAB
ALBUMIN SERPL BCP-MCNC: 4 G/DL (ref 3.5–5.2)
ALBUMIN SERPL BCP-MCNC: 4.1 G/DL (ref 3.5–5.2)
ALP SERPL-CCNC: 100 U/L (ref 55–135)
ALP SERPL-CCNC: 99 U/L (ref 55–135)
ALT SERPL W/O P-5'-P-CCNC: 10 U/L (ref 10–44)
ALT SERPL W/O P-5'-P-CCNC: 13 U/L (ref 10–44)
AMPHET+METHAMPHET UR QL: NEGATIVE
ANION GAP SERPL CALC-SCNC: 10 MMOL/L (ref 8–16)
ANION GAP SERPL CALC-SCNC: 12 MMOL/L (ref 8–16)
APAP SERPL-MCNC: <3 UG/ML (ref 10–20)
AST SERPL-CCNC: 24 U/L (ref 10–40)
AST SERPL-CCNC: 53 U/L (ref 10–40)
B-HCG UR QL: NEGATIVE
BACTERIA #/AREA URNS HPF: ABNORMAL /HPF
BARBITURATES UR QL SCN>200 NG/ML: NEGATIVE
BASOPHILS # BLD AUTO: 0.02 K/UL (ref 0–0.2)
BASOPHILS NFR BLD: 0.2 % (ref 0–1.9)
BENZODIAZ UR QL SCN>200 NG/ML: ABNORMAL
BILIRUB SERPL-MCNC: 1.4 MG/DL (ref 0.1–1)
BILIRUB SERPL-MCNC: 1.8 MG/DL (ref 0.1–1)
BILIRUB UR QL STRIP: ABNORMAL
BUN SERPL-MCNC: 7 MG/DL (ref 6–20)
BUN SERPL-MCNC: 8 MG/DL (ref 6–20)
BZE UR QL SCN: NEGATIVE
CALCIUM SERPL-MCNC: 9.2 MG/DL (ref 8.7–10.5)
CALCIUM SERPL-MCNC: 9.5 MG/DL (ref 8.7–10.5)
CANNABINOIDS UR QL SCN: ABNORMAL
CHLORIDE SERPL-SCNC: 107 MMOL/L (ref 95–110)
CHLORIDE SERPL-SCNC: 109 MMOL/L (ref 95–110)
CHOLEST SERPL-MCNC: 103 MG/DL (ref 120–199)
CHOLEST/HDLC SERPL: 1.8 {RATIO} (ref 2–5)
CLARITY UR: ABNORMAL
CO2 SERPL-SCNC: 15 MMOL/L (ref 23–29)
CO2 SERPL-SCNC: 21 MMOL/L (ref 23–29)
COLOR UR: YELLOW
CREAT SERPL-MCNC: 0.7 MG/DL (ref 0.5–1.4)
CREAT SERPL-MCNC: 0.7 MG/DL (ref 0.5–1.4)
CREAT UR-MCNC: 429.1 MG/DL (ref 15–325)
CTP QC/QA: YES
DIFFERENTIAL METHOD: ABNORMAL
EOSINOPHIL # BLD AUTO: 0 K/UL (ref 0–0.5)
EOSINOPHIL NFR BLD: 0.1 % (ref 0–8)
ERYTHROCYTE [DISTWIDTH] IN BLOOD BY AUTOMATED COUNT: 14.7 % (ref 11.5–14.5)
EST. GFR  (AFRICAN AMERICAN): >60 ML/MIN/1.73 M^2
EST. GFR  (AFRICAN AMERICAN): >60 ML/MIN/1.73 M^2
EST. GFR  (NON AFRICAN AMERICAN): >60 ML/MIN/1.73 M^2
EST. GFR  (NON AFRICAN AMERICAN): >60 ML/MIN/1.73 M^2
ESTIMATED AVG GLUCOSE: 88 MG/DL (ref 68–131)
ETHANOL SERPL-MCNC: <10 MG/DL
GLUCOSE SERPL-MCNC: 74 MG/DL (ref 70–110)
GLUCOSE SERPL-MCNC: 95 MG/DL (ref 70–110)
GLUCOSE UR QL STRIP: NEGATIVE
HBA1C MFR BLD HPLC: 4.7 % (ref 4–5.6)
HCT VFR BLD AUTO: 46.4 % (ref 37–48.5)
HDLC SERPL-MCNC: 56 MG/DL (ref 40–75)
HDLC SERPL: 54.4 % (ref 20–50)
HGB BLD-MCNC: 15 G/DL (ref 12–16)
HGB UR QL STRIP: ABNORMAL
HYALINE CASTS #/AREA URNS LPF: 0 /LPF
IMM GRANULOCYTES # BLD AUTO: 0.03 K/UL (ref 0–0.04)
IMM GRANULOCYTES NFR BLD AUTO: 0.3 % (ref 0–0.5)
KETONES UR QL STRIP: ABNORMAL
LDLC SERPL CALC-MCNC: 35 MG/DL (ref 63–159)
LEUKOCYTE ESTERASE UR QL STRIP: ABNORMAL
LYMPHOCYTES # BLD AUTO: 1 K/UL (ref 1–4.8)
LYMPHOCYTES NFR BLD: 9.9 % (ref 18–48)
MAGNESIUM SERPL-MCNC: 2.1 MG/DL (ref 1.6–2.6)
MCH RBC QN AUTO: 34.2 PG (ref 27–31)
MCHC RBC AUTO-ENTMCNC: 32.3 G/DL (ref 32–36)
MCV RBC AUTO: 106 FL (ref 82–98)
METHADONE UR QL SCN>300 NG/ML: NEGATIVE
MICROSCOPIC COMMENT: ABNORMAL
MONOCYTES # BLD AUTO: 0.6 K/UL (ref 0.3–1)
MONOCYTES NFR BLD: 5.5 % (ref 4–15)
NEUTROPHILS # BLD AUTO: 8.7 K/UL (ref 1.8–7.7)
NEUTROPHILS NFR BLD: 84 % (ref 38–73)
NITRITE UR QL STRIP: NEGATIVE
NONHDLC SERPL-MCNC: 47 MG/DL
NRBC BLD-RTO: 0 /100 WBC
OPIATES UR QL SCN: NEGATIVE
PCP UR QL SCN>25 NG/ML: NEGATIVE
PH UR STRIP: 6 [PH] (ref 5–8)
PLATELET # BLD AUTO: 327 K/UL (ref 150–350)
PMV BLD AUTO: 9.1 FL (ref 9.2–12.9)
POTASSIUM SERPL-SCNC: 3.2 MMOL/L (ref 3.5–5.1)
POTASSIUM SERPL-SCNC: 4.6 MMOL/L (ref 3.5–5.1)
PROT SERPL-MCNC: 7.5 G/DL (ref 6–8.4)
PROT SERPL-MCNC: 7.7 G/DL (ref 6–8.4)
PROT UR QL STRIP: ABNORMAL
RBC # BLD AUTO: 4.38 M/UL (ref 4–5.4)
RBC #/AREA URNS HPF: 3 /HPF (ref 0–4)
SARS-COV-2 RDRP RESP QL NAA+PROBE: NEGATIVE
SODIUM SERPL-SCNC: 136 MMOL/L (ref 136–145)
SODIUM SERPL-SCNC: 138 MMOL/L (ref 136–145)
SP GR UR STRIP: >=1.03 (ref 1–1.03)
SQUAMOUS #/AREA URNS HPF: 4 /HPF
TOXICOLOGY INFORMATION: ABNORMAL
TRIGL SERPL-MCNC: 60 MG/DL (ref 30–150)
TSH SERPL DL<=0.005 MIU/L-ACNC: 1.4 UIU/ML (ref 0.4–4)
URN SPEC COLLECT METH UR: ABNORMAL
UROBILINOGEN UR STRIP-ACNC: 1 EU/DL
WBC # BLD AUTO: 10.33 K/UL (ref 3.9–12.7)
WBC #/AREA URNS HPF: 100 /HPF (ref 0–5)

## 2020-06-20 PROCEDURE — 94761 N-INVAS EAR/PLS OXIMETRY MLT: CPT

## 2020-06-20 PROCEDURE — 96366 THER/PROPH/DIAG IV INF ADDON: CPT

## 2020-06-20 PROCEDURE — 36415 COLL VENOUS BLD VENIPUNCTURE: CPT

## 2020-06-20 PROCEDURE — 96375 TX/PRO/DX INJ NEW DRUG ADDON: CPT

## 2020-06-20 PROCEDURE — G0378 HOSPITAL OBSERVATION PER HR: HCPCS

## 2020-06-20 PROCEDURE — 86703 HIV-1/HIV-2 1 RESULT ANTBDY: CPT

## 2020-06-20 PROCEDURE — 80061 LIPID PANEL: CPT

## 2020-06-20 PROCEDURE — 63600175 PHARM REV CODE 636 W HCPCS: Performed by: STUDENT IN AN ORGANIZED HEALTH CARE EDUCATION/TRAINING PROGRAM

## 2020-06-20 PROCEDURE — 93010 ELECTROCARDIOGRAM REPORT: CPT | Mod: ,,, | Performed by: INTERNAL MEDICINE

## 2020-06-20 PROCEDURE — 83735 ASSAY OF MAGNESIUM: CPT

## 2020-06-20 PROCEDURE — 25000003 PHARM REV CODE 250: Performed by: PSYCHIATRY & NEUROLOGY

## 2020-06-20 PROCEDURE — 25000003 PHARM REV CODE 250: Performed by: STUDENT IN AN ORGANIZED HEALTH CARE EDUCATION/TRAINING PROGRAM

## 2020-06-20 PROCEDURE — 99282 EMERGENCY DEPT VISIT SF MDM: CPT | Mod: 25,,, | Performed by: OTOLARYNGOLOGY

## 2020-06-20 PROCEDURE — 25500020 PHARM REV CODE 255: Performed by: EMERGENCY MEDICINE

## 2020-06-20 PROCEDURE — 31575 DIAGNOSTIC LARYNGOSCOPY: CPT | Mod: ,,, | Performed by: OTOLARYNGOLOGY

## 2020-06-20 PROCEDURE — 96365 THER/PROPH/DIAG IV INF INIT: CPT | Mod: 59

## 2020-06-20 PROCEDURE — 96376 TX/PRO/DX INJ SAME DRUG ADON: CPT

## 2020-06-20 PROCEDURE — 93005 ELECTROCARDIOGRAM TRACING: CPT

## 2020-06-20 PROCEDURE — 63600175 PHARM REV CODE 636 W HCPCS: Performed by: EMERGENCY MEDICINE

## 2020-06-20 PROCEDURE — 80053 COMPREHEN METABOLIC PANEL: CPT

## 2020-06-20 PROCEDURE — 31575 PR LARYNGOSCOPY, FLEXIBLE; DIAGNOSTIC: ICD-10-PCS | Mod: ,,, | Performed by: OTOLARYNGOLOGY

## 2020-06-20 PROCEDURE — 80074 ACUTE HEPATITIS PANEL: CPT

## 2020-06-20 PROCEDURE — 85025 COMPLETE CBC W/AUTO DIFF WBC: CPT

## 2020-06-20 PROCEDURE — 93010 EKG 12-LEAD: ICD-10-PCS | Mod: ,,, | Performed by: INTERNAL MEDICINE

## 2020-06-20 PROCEDURE — 99282 PR EMERGENCY DEPT VISIT,LEVEL II: ICD-10-PCS | Mod: 25,,, | Performed by: OTOLARYNGOLOGY

## 2020-06-20 PROCEDURE — 83036 HEMOGLOBIN GLYCOSYLATED A1C: CPT

## 2020-06-20 RX ORDER — DEXAMETHASONE SODIUM PHOSPHATE 4 MG/ML
12 INJECTION, SOLUTION INTRA-ARTICULAR; INTRALESIONAL; INTRAMUSCULAR; INTRAVENOUS; SOFT TISSUE
Status: COMPLETED | OUTPATIENT
Start: 2020-06-20 | End: 2020-06-20

## 2020-06-20 RX ORDER — ESCITALOPRAM OXALATE 5 MG/1
5 TABLET ORAL DAILY
Status: DISCONTINUED | OUTPATIENT
Start: 2020-06-20 | End: 2020-06-21 | Stop reason: HOSPADM

## 2020-06-20 RX ORDER — DEXAMETHASONE SODIUM PHOSPHATE 4 MG/ML
8 INJECTION, SOLUTION INTRA-ARTICULAR; INTRALESIONAL; INTRAMUSCULAR; INTRAVENOUS; SOFT TISSUE ONCE
Status: COMPLETED | OUTPATIENT
Start: 2020-06-20 | End: 2020-06-20

## 2020-06-20 RX ORDER — ACETAMINOPHEN 325 MG/1
650 TABLET ORAL EVERY 6 HOURS PRN
Status: DISCONTINUED | OUTPATIENT
Start: 2020-06-20 | End: 2020-06-21 | Stop reason: HOSPADM

## 2020-06-20 RX ORDER — SODIUM CHLORIDE 0.9 % (FLUSH) 0.9 %
10 SYRINGE (ML) INJECTION
Status: DISCONTINUED | OUTPATIENT
Start: 2020-06-20 | End: 2020-06-21 | Stop reason: HOSPADM

## 2020-06-20 RX ORDER — SODIUM CHLORIDE, SODIUM LACTATE, POTASSIUM CHLORIDE, CALCIUM CHLORIDE 600; 310; 30; 20 MG/100ML; MG/100ML; MG/100ML; MG/100ML
INJECTION, SOLUTION INTRAVENOUS CONTINUOUS
Status: DISCONTINUED | OUTPATIENT
Start: 2020-06-20 | End: 2020-06-20

## 2020-06-20 RX ORDER — OLANZAPINE 2.5 MG/1
5 TABLET ORAL 2 TIMES DAILY
Status: DISCONTINUED | OUTPATIENT
Start: 2020-06-20 | End: 2020-06-21 | Stop reason: HOSPADM

## 2020-06-20 RX ADMIN — ACETAMINOPHEN 650 MG: 325 TABLET ORAL at 04:06

## 2020-06-20 RX ADMIN — AMPICILLIN SODIUM AND SULBACTAM SODIUM 3 G: 2; 1 INJECTION, POWDER, FOR SOLUTION INTRAMUSCULAR; INTRAVENOUS at 09:06

## 2020-06-20 RX ADMIN — AMPICILLIN SODIUM AND SULBACTAM SODIUM 3 G: 2; 1 INJECTION, POWDER, FOR SOLUTION INTRAMUSCULAR; INTRAVENOUS at 03:06

## 2020-06-20 RX ADMIN — DEXAMETHASONE SODIUM PHOSPHATE 8 MG: 4 INJECTION, SOLUTION INTRAMUSCULAR; INTRAVENOUS at 10:06

## 2020-06-20 RX ADMIN — AMPICILLIN SODIUM AND SULBACTAM SODIUM 3 G: 2; 1 INJECTION, POWDER, FOR SOLUTION INTRAMUSCULAR; INTRAVENOUS at 10:06

## 2020-06-20 RX ADMIN — ESCITALOPRAM OXALATE 5 MG: 5 TABLET, FILM COATED ORAL at 05:06

## 2020-06-20 RX ADMIN — LORAZEPAM 2 MG: 2 INJECTION INTRAMUSCULAR; INTRAVENOUS at 02:06

## 2020-06-20 RX ADMIN — OLANZAPINE 5 MG: 2.5 TABLET, FILM COATED ORAL at 05:06

## 2020-06-20 RX ADMIN — IOHEXOL 75 ML: 350 INJECTION, SOLUTION INTRAVENOUS at 12:06

## 2020-06-20 RX ADMIN — DEXAMETHASONE SODIUM PHOSPHATE 12 MG: 4 INJECTION, SOLUTION INTRAMUSCULAR; INTRAVENOUS at 03:06

## 2020-06-20 RX ADMIN — CEFTRIAXONE 1 G: 1 INJECTION, SOLUTION INTRAVENOUS at 02:06

## 2020-06-20 NOTE — ASSESSMENT & PLAN NOTE
Recommend admission for observation. No evidence of airway obstruction at this time. Monitor for any signs or symptoms or worsening laryngeal edema including stridor, acute change in voice, or hemoptysis.  Conservative management including Decadron 12mg IVx 1 dose now and then 8mg IV in 8 hours, elevate HOB, and voice rest. If no signs of worsening after observation period, anticipate medical clearance for further psychiatric care.

## 2020-06-20 NOTE — NURSING
"Patient arrives to  via stretcher accompanied by RN, sitter, and security. Connected to in room cardiac monitor and transferred to critical care bed. Awakens to physical touch or verbal stimuli but doesn't answer questions. Patient only stated "my neck hurts". Very withdrawn/flat affect and hesitant to follow commands, but able to if she wants to. Ligature marks to neck. No other skin issues noted. Breath sounds equal and clear. O2 sats 100% on RA. Purewick placed and connected to suction. tyesha Rebollar at bedside. Will continue to monitor  "

## 2020-06-20 NOTE — EICU
New Patient Evaluation    40 y/o F history of bipolar disorder brought in by EMS after significant other found her with an extension cord around her neck. She was combative and experiencing audible hallucinations. She had to be restrained and given midazolam, lorazepam and haloperidol.  CT of the neck did not reveal any soft tissue swelling nor fractures. There was finding of trace anterior pneumomediastinum. Patent airway.  This was confirmed by ENT evaluation using a flexible scope. Tox screen positive for benzos and marijuana.    Camera assessment:  Patient is seen asleep    Telemetry:  /65  HR 68  O2 97%    · Pneumomediastinum, CXR to monitor for extension  · Suicidal ideation, PEC'd. Will need psych eval.

## 2020-06-20 NOTE — PROCEDURES
"Jean Pierre Worrell is a 39 y.o. female patient.    Temp: 98.3 °F (36.8 °C) (06/19/20 2207)  Pulse: (!) 56 (06/20/20 0250)  Resp: 14 (06/20/20 0250)  BP: 135/84 (06/20/20 0249)  SpO2: 99 % (06/20/20 0249)  Weight: 61.2 kg (135 lb) (06/19/20 2207)  Height: 5' 2" (157.5 cm) (06/19/20 2207)       Procedures     Due to indication in patient's history, presentation or risk factors,  a fiber optic exam was performed.    SEPARATE PROCEDURE NOTE:    ANESTHESIA:  Topical xylocaine with domo-synephrine    FINDINGS:  Mild supraglottic edema; endolarynx intact; no mucosal interruptions; no hematoma      PROCEDURE:  After verbal consent was obtained, the flexible scope was passed through the patient's nasal cavity without difficulty.  The nasopharynx (adenoid pad) and eustachian tube orifices were first visualized and were found to be normal, without masses or irregularity.  The posterior pharyngeal wall and base of tongue were then examined and no mass or irregular tissue was seen.  The scope was then advanced to the larynx, and the epiglottis, valleculae, and piriform sinuses were normal, without masses or mucosal irregularity.  The false vocal folds and true vocal folds were then examined and were found to have normal mobility (full abduction and adduction) and no masses or mucosal irregularity was seen. Mild supraglottic edema is present; endolarynx intact; no mucosal interruptions; no hematoma   The arytenoids and the interarytenoid area were normal. The patient tolerated the procedure well without complications.       Jyotsna Mauricio  6/20/2020  "

## 2020-06-20 NOTE — CONSULTS
[facility]  Psychiatric Evaluation  Consult Note    Diagnostic Interview - CPT 60186    Patient Name: Jean Pierre Worrell  MRN: [id number]   Patient Class: OP- Observation  Admission Date: 6/19/2020  Hospital Length of Stay: 0 days  Attending Physician: Ariadna Charles MD  Primary Care Provider: Primary Doctor No    Consults    Identification Data: This is a 39 y.o. Black or  female who was admitted to [facility]. This is a consult requested by Mai De Santiago for psych evaluation.       Chief Complaint:  I was hearing voices and that scared me    History of Present Illness:   According to H and P patient was hanging hanging by significant other.  Patient had extension cord around her neck and she still has marksof extension cord on neck.  And was belligerent and required p.r.n. in the ER.   Patient reported that she was hearing voices of lot of people and it was like a screaming of her children she would not found them and got scared.  She thought that someone was messing with her mind.  Patient admitted that she has been hearing voices for the last 3 years and she hears a lot of people talking to her and voices comes and goes.  She denies visual hallucinations.  Patient stated she is very depressed lately and missing her grandmom a.  Her depression comes and goes with them she feels hopeless helpless worthless anhedonic and for the 1st time she had thoughts of killing herself yesterday.  She had regret about her hanging but she thought that that was way out at the time.  Patient stated that she suffers from anxiety and her anxiety is usually ended up with a panic attack.  She does not know why she is positive for benzos but reported that she was taking Xanax in the past.  She drinks alcohol now and then, lately she is drinking more that is up to 12 pack per day.  She gets mild withdrawals but no DTs and seizures.  She denies self conversation.  She suffers from paranoia and  current medications are not helping with voices and paranoia.  Patient states that she has no job no money and denied by disability people in spite of her severe mental illness.  She tried to return to work which she gets paranoid at work.  Patient states that her psychiatrist told her to apply for disability before she can not work affectivly.  He claims to be compliant with medications.  She denies use of drugs obsessive-compulsive features or agoraphobia   Past Psychiatric History:  Patient reported that she has been hospitalized about 4 or 5 times in the past.  She [facility] before she was on Risperdal which she did like.  Patient does to [facility] and sees Dr. Grant.  She was arrested for a couple of days in the past.  She has no history of rehab.  She denies history of suicide or homicide or legal problems    Past Medical History:  History reviewed. No pertinent past medical history.      Social History:  Patient was born and raised in [place].  She described her childhood as okay.  She was raised by her grand mom.  She has limited education.  She is single and has 4 children.  She lives with her family.  She is unemployed and has no source of income.  Her boyfriend is supportive.  She denies family history of suicide but uncle suffered from some mental illness.   reports that she has been smoking cigarettes. She has been smoking about 0.50 packs per day. She uses smokeless tobacco. She reports current alcohol use of about 0.8 standard drinks of alcohol per week. She reports that she does not use drugs.             Psychotherapeutics (From admission, onward)    None            Current Evaluation:     Mental Status Exam:  This is a 39-year-old  female who is sitting in bed comfortably.  She has marks around her neck due to extension cord in an attempt to hang herself.  Her mood is still depressed with sad affect.  She is attentive.  She  had hard time to remember day and date but was able to tell the reason for admission month and her mental health center.  She reported that she hears voices of lot of people, she hears screaming and at times mama like kids are calling mama mama.  She admitted that she tried to kill herself but she did not mean to kill herself and she has regret about her hanging.  He was paranoid at home and thought that someone was messing with her mind.  Insight and judgment are impaired.  She is of average intelligent person.  She has poor impulse control.  She has adequate fund of knowledge    Psychiatric Diagnosis:  AXIS I:  Major depressive disorder with psychotic features, rule out bipolar disorder with psychotic features    AXIS II:  No diagnosis    AXIS III:  UTI, questionable laryngeal injury    AXIS IV:  Employed, financial difficulties, chronic mental illness, partial response to medications, conflict with children    AXIS V:  20    Assessment, Recommendation and Plans:     Will initiate this patient on Risperdal 2 5 in the morning and 10 at nighttime for hallucinations.  Will initiate Lexapro 5 in the morning for 1 day then 10 mg p.o. q.a.m. for depression and anxiety.  Will transfer this patient to [facility] once medically clear    Prognosis:     Able to Give Consent:       Strengths and Liabilities:       Discharge Plans:      Jessy Amanda MD  Psychiatry  [facility]

## 2020-06-20 NOTE — ED NOTES
Adult Physical Assessment  LOC: Jean Pierre Worrell, 39 y.o. female who is wailing upon arrival and not communicating effectively until she was medicated and is now sleeping. Does acknowledge her name and followed simple commands while meds were metabolizing.   APPEARANCE: AFTER medicating,  Patient resting comfortably and appears to be in no acute distress at this time. Clothes were dirty, she is disheveled, urine soaked and no shoes. Placed in paper scrubs and socks after wiping her down and changing the linen.   SKIN:The skin is warm and dry, color consistent with ethnicity, patient has normal skin turgor and moist mucus membranes, skin intact, no breakdown or brusing noted. BRUISING NOTED AROUND NECK< ligature marks as well as some abrasions.   MUSCULOSKELETAL: Patient moving all extremities well, no obvious swelling or deformities noted.  RESPIRATORY: Airway is open and patent, respirations are spontaneous, patient has a normal effort and rate, no accessory muscle use noted.  CARDIAC: Patient has a normal rate and rhythm, no periphreal edema noted in any extremity, capillary refill < 3 seconds in all extremities  ABDOMEN: Soft and non tender to palpation, no abdominal distention noted. Bowel sounds present in all four quadrants.  NEUROLOGIC: Eyes open spontaneously, responds to voice and gentle stim after being medicated.

## 2020-06-20 NOTE — CONSULTS
"[facility]  Otorhinolaryngology-Head & Neck Surgery  Consult Note    Patient Name: Jean Pierre Worrell  MRN: [id number]  Code Status: No Order  Admission Date: 6/19/2020  Hospital Length of Stay: 0 days  Attending Physician: Bebeto Buckner MD  Primary Care Provider: Primary Doctor No    Patient information was obtained from ER records.     Consults  Subjective:     Reason for consult: laryngeal trauma.       History of Present Illness: Jean Pierre Worrell is a 39 y.o. female with history of bipolar disorder who presents to the ED due to suicidal ideations. Per EMS, patient was found by significant other hanging with an extension cord around her neck. Upon evaluation, patient reports she is hearing things that sound like "screaming." History is limited due to patient's mental status.  Patient noted to have strangulation marks on her neck. CT neck was performed to further evaluate shows trace pneumomediastinum. Per ED physician pateint with strong voice upon presentation- no hoarseness. No stridor, stertor, or labored breathing since presentation here in the ED. Currently, sedated with ativan and haldol.     Medications:  Continuous Infusions:  Scheduled Meds:   dexamethasone  12 mg Intravenous ED 1 Time     PRN Meds:     No current facility-administered medications on file prior to encounter.      Current Outpatient Medications on File Prior to Encounter   Medication Sig    prenatal vit-iron fumarate-FA 27-1 mg Tab Take 1 tablet by mouth once daily.    risperiDONE (RISPERDAL) 1 MG tablet Take 1 tablet (1 mg total) by mouth 2 (two) times daily.       Review of patient's allergies indicates:  No Known Allergies    History reviewed. No pertinent past medical history.  Past Surgical History:   Procedure Laterality Date    BARTHOLIN GLAND CYST EXCISION       Family History     None        Tobacco Use    Smoking status: Current Some Day Smoker     Packs/day: 0.50     Types: Cigarettes    Smokeless " tobacco: Current User   Substance and Sexual Activity    Alcohol use: Yes     Alcohol/week: 0.8 standard drinks     Types: 1 Standard drinks or equivalent per week     Comment: drink about 4/5 cans a week    Drug use: No    Sexual activity: Not on file     Review of Systems  Objective:     Vital Signs (Most Recent):  Temp: 98.3 °F (36.8 °C) (06/19/20 2207)  Pulse: 75 (06/19/20 2207)  Resp: 16 (06/19/20 2207)  BP: 124/83 (06/19/20 2207)  SpO2: 99 % (06/19/20 2207) Vital Signs (24h Range):  Temp:  [98.3 °F (36.8 °C)] 98.3 °F (36.8 °C)  Pulse:  [75] 75  Resp:  [16] 16  SpO2:  [99 %] 99 %  BP: (124)/(83) 124/83     Weight: 61.2 kg (135 lb)  Body mass index is 24.69 kg/m².        Physical Exam     Vitals:    06/19/20 2207   BP: 124/83   Pulse: 75   Resp: 16   Temp: 98.3 °F (36.8 °C)       Constitutional: Resting comfortably in bed;  NAD.  Eyes: EOM I Bilaterally  Head/Face: Normocephalic.  Negative paranasal sinus pressure/tenderness.  Salivary glands WNL.  House Brackmann I Bilaterally.    Nose: No gross nasal septal deviation. Inferior Turbinates 3+ bilaterally. No septal perforation. No masses/lesions. External nasal skin without masses/lesions.  Oral Cavity: Gingiva/lips WNL.  FOM Soft, no masses palpated. Oral Tongue mobile. Hard Palate WNL.   Oropharynx: BOT WNL. No masses/lesions noted. Tonsillar fossa/pharyngeal wall without lesions. Posterior oropharynx WNL.  Soft palate without masses. Midline uvula.   Neck/Lymphatic: + linear ecchymotic circumferential strangulation markings around neck; no crepitus noted.  No LAD I-VI bilaterally.  No thyromegaly.  No masses noted on exam.    Mirror laryngoscopy/nasopharyngoscopy: Active gag reflex.  Unable to perform.    Neuro/Psychiatric: AOx3.  Normal mood and affect.   Cardiovascular: Normal carotid pulses bilaterally, no increasing jugular venous distention noted at cervical region bilaterally.    Respiratory: Normal respiratory effort, no stridor, no retractions  noted.      Significant Labs:  COVID-negative  BMP:   Recent Labs   Lab 06/19/20  2312   GLU 95      CO2 21*   BUN 8   CREATININE 0.7   CALCIUM 9.5     CBC:   Recent Labs   Lab 06/19/20  2312   WBC 16.21*   RBC 4.20   HGB 14.5   HCT 40.9      MCV 97   MCH 34.5*   MCHC 35.5       Significant Diagnostics:  CT soft tissue neck/chest: trace pneumomediastinum; no significant airway edema; laryngeal framework intact without fracture.     Assessment/Plan:     Laryngeal trauma, initial encounter  Recommend admission for observation. No evidence of airway obstruction at this time. Monitor for any signs or symptoms or worsening laryngeal edema including stridor, acute change in voice, or hemoptysis.  Conservative management including Decadron 12mg IVx 1 dose now and then 8mg IV in 8 hours, elevate HOB, and voice rest. If no signs of worsening after observation period, anticipate medical clearance for further psychiatric care.     VTE Risk Mitigation (From admission, onward)    None          Thank you for your consult.    Jyotsna Mauricio MD  Otorhinolaryngology-Head & Neck Surgery  [facility]

## 2020-06-20 NOTE — NURSING TRANSFER
Nursing Transfer Note      6/20/2020     Transfer To: 531    Transfer via bed    Transfer with cardiac monitoring    Transported by Transport, ICU RN, and PCT    Chart send with patient: Yes    Patient reassessed at: RN in room upon my arrival    Upon arrival to floor: cardiac monitor applied, patient oriented to room, call bell in reach and bed in lowest position

## 2020-06-20 NOTE — ED PROVIDER NOTES
"Encounter Date: 6/19/2020    SCRIBE #1 NOTE: I, Melania Mcclain, am scribing for, and in the presence of,  Dr. Buckner. I have scribed the entire note.       History     Chief Complaint   Patient presents with    Suicidal     found by significant other with extension cord around her neck.  marks noted all around neck.  pt placed in restraints by EMS for being combative with EMS.  yelling and non-compliant with ED staff.  Versed 5 mg given by EMS     Jean Pierre Worrell is a 39 y.o. female who presents to the ED due to suicidal ideations. Per EMS, patient was found by significant other with an extension cord around her neck. Upon evaluation, patient reports she is hearing things that sound like "screaming." History is limited due to patient's mental status.     The history is provided by the patient.     Review of patient's allergies indicates:  No Known Allergies  History reviewed. No pertinent past medical history.  Past Surgical History:   Procedure Laterality Date    BARTHOLIN GLAND CYST EXCISION       History reviewed. No pertinent family history.  Social History     Tobacco Use    Smoking status: Current Some Day Smoker     Packs/day: 0.50     Types: Cigarettes    Smokeless tobacco: Current User   Substance Use Topics    Alcohol use: Yes     Alcohol/week: 0.8 standard drinks     Types: 1 Standard drinks or equivalent per week     Comment: drink about 4/5 cans a week    Drug use: No     Review of Systems   Unable to perform ROS: Psychiatric disorder       Physical Exam     Initial Vitals [06/19/20 2207]   BP Pulse Resp Temp SpO2   124/83 75 16 98.3 °F (36.8 °C) 99 %      MAP       --         Physical Exam    Constitutional: She appears well-developed and well-nourished. She is not diaphoretic.   Depressed. Extremely tearful.    HENT:   Head: Normocephalic and atraumatic.   Eyes: Conjunctivae and EOM are normal. Pupils are equal, round, and reactive to light.   Neck: Normal range of motion. Neck supple. "   Cardiovascular: Normal rate, regular rhythm, normal heart sounds and intact distal pulses.   Pulmonary/Chest: Breath sounds normal. No respiratory distress.   Abdominal: Soft. Bowel sounds are normal. She exhibits no distension. There is no abdominal tenderness.   Musculoskeletal: Normal range of motion. No edema.   Neurological: She is alert. She has normal strength.   Skin: Skin is warm and dry.   Strangulation marks to neck.          ED Course   Critical Care    Date/Time: 6/20/2020 4:42 AM  Performed by: Bebeto Buckner MD  Authorized by: Bebeto Buckner MD   Total critical care time (exclusive of procedural time) : 50 minutes  Critical care was necessary to treat or prevent imminent or life-threatening deterioration of the following conditions: Strangulation injury, airway swelling.  Comments: 50 min of critical care time, including frequent re-evaluations, frequent dosing medication for acute psychosis, evaluation of her airway due to acute strangulation injury        Labs Reviewed   CBC W/ AUTO DIFFERENTIAL - Abnormal; Notable for the following components:       Result Value    WBC 16.21 (*)     Mean Corpuscular Hemoglobin 34.5 (*)     MPV 9.0 (*)     Gran # (ANC) 13.5 (*)     Immature Grans (Abs) 0.06 (*)     Mono # 1.2 (*)     Gran% 83.4 (*)     Lymph% 8.5 (*)     All other components within normal limits   COMPREHENSIVE METABOLIC PANEL - Abnormal; Notable for the following components:    Potassium 3.2 (*)     CO2 21 (*)     Total Bilirubin 1.4 (*)     All other components within normal limits   URINALYSIS, REFLEX TO URINE CULTURE - Abnormal; Notable for the following components:    Appearance, UA Cloudy (*)     Specific Gravity, UA >=1.030 (*)     Protein, UA 1+ (*)     Ketones, UA 1+ (*)     Bilirubin (UA) 1+ (*)     Occult Blood UA 1+ (*)     Leukocytes, UA 1+ (*)     All other components within normal limits    Narrative:     Preferred Collection Type->Urine, Clean Catch  Specimen Source->Urine    DRUG SCREEN PANEL, URINE EMERGENCY - Abnormal; Notable for the following components:    Creatinine, Random Ur 429.1 (*)     All other components within normal limits    Narrative:     Preferred Collection Type->Urine, Clean Catch  Specimen Source->Urine   ACETAMINOPHEN LEVEL - Abnormal; Notable for the following components:    Acetaminophen (Tylenol), Serum <3.0 (*)     All other components within normal limits   URINALYSIS MICROSCOPIC - Abnormal; Notable for the following components:    WBC,  (*)     Bacteria Few (*)     All other components within normal limits    Narrative:     Preferred Collection Type->Urine, Clean Catch  Specimen Source->Urine   CULTURE, URINE   TSH   ALCOHOL,MEDICAL (ETHANOL)   SARS-COV-2 RNA AMPLIFICATION, QUAL   CBC W/ AUTO DIFFERENTIAL   COMPREHENSIVE METABOLIC PANEL   HEMOGLOBIN A1C   LIPID PANEL   MAGNESIUM   HIV 1 / 2 ANTIBODY   HEPATITIS PANEL, ACUTE   POCT URINE PREGNANCY          Imaging Results          CT Neck Chest With Contrast (XPD) (Final result)  Result time 06/20/20 01:04:50    Final result by Erasto Hernandez MD (06/20/20 01:04:50)                 Impression:      Trace anterior pneumomediastinum.      Electronically signed by: Erasto Hernandez MD  Date:    06/20/2020  Time:    01:04             Narrative:    EXAMINATION:  CT NECK CHEST WITH CONTRAST (XPD)    CLINICAL HISTORY:  Strangulation injury;    TECHNIQUE:  CT neck and chest were obtained following administration of 75 cc Omnipaque 350 IV contrast.    COMPARISON:  None.    FINDINGS:  Visualized portion of the brain shows no significant abnormalities.  Orbits are normal in appearance.  Visualized paranasal sinuses and mastoid air cells are essentially clear.  Parotid glands and submandibular glands are symmetric in size.  Thyroid gland is unremarkable.  No evidence of prevertebral soft tissue swelling.  Epiglottis is normal in thickness.  No acute osseous abnormality identified.  Several broken dentition, dental  caries, and periapical lucencies are seen.    Trace pneumomediastinum is seen anteriorly.  Airways are patent.  Lungs are clear and symmetrically expanded.  No evidence of focal consolidation, pneumothorax, or pleural effusion.  Heart is normal in size without pericardial effusion.  Aorta is nonaneurysmal.  No significant abnormalities are seen along the esophageal course.    Partially visualized upper abdominal structures show no significant or acute abnormalities.                                 Medical Decision Making:   Initial Assessment:   39-year-old female with history of bipolar disorder presents the ER brought in by EMS for suicidal attempt.  Patient was found by significant other, hanging with electric cord around her neck.  She read in the ER, history is limited due to her mental status.  She is crying, combative, saying she is hearing voices.  She is moving all extremities but is not answering questions appropriately.  She has significant ligation marks on her neck from the strangulation attempt.  She is maintaining her airway.  Rest of physical exam grossly unremarkable.  Patient will need chemical sedation likely restraints for her safety MI staff says well.  Will obtain blood work CT of the neck and will reassess.  Will plan to medically clear admit to psych.  Clinical Tests:   Lab Tests: Ordered and Reviewed  Radiological Study: Ordered and Reviewed  Medical Tests: Ordered and Reviewed                   ED Course as of Jun 20 0443   Sat Jun 20, 2020   0131 Resting in bed, no distress.  CT reviewed trace pneumomediastinum noted.  Discussed with ENT.  Will review imaging and informed me of plan.    [SE]   0156 Resting in bed, no distress.  Discussed with ENT.  Will, evaluate patient and performed bedside scope.  Will plan to sedate patient more to perform procedure.    [SE]   0242 ENT bedside comp form scope on patient.  Reports some swelling but no bruising.  Requested admission for observation, also  Decadron.  Will give Decadron plan admission.    [SE]      ED Course User Index  [SE] Bebeto Buckner MD                Clinical Impression:       ICD-10-CM ICD-9-CM   1. Intentional self-harm by hanging  X83.8XXA E953.0   2. Medical clearance for psychiatric admission  Z00.8 V70.8   3. Acute psychosis  F23 298.9   4. Suicide attempt  T14.91XA E958.9   5. Laryngeal trauma, initial encounter  S19.81XA 959.09         Disposition:   Disposition: Admitted  Condition: Fair     ED Disposition Condition    Observation           My Scribe Attestation: I acknowledge that the documentation on this chart was provided by described on the date of service noted above and that the documentation in the chart accurately reflects work and decisions made by me alone.                   Bebeto Buckner MD  06/20/20 0443

## 2020-06-20 NOTE — HPI
"Jean Pierre Worrell is a 39 y.o. female with history of bipolar disorder who presents to the ED due to suicidal ideations. Per EMS, patient was found by significant other hanging with an extension cord around her neck. Upon evaluation, patient reports she is hearing things that sound like "screaming." History is limited due to patient's mental status.  Patient noted to have strangulation marks on her neck. CT neck was performed to further evaluate shows trace pneumomediastinum. Per ED physician pateint with strong voice upon presentation- no hoarseness. No stridor, stertor, or labored breathing since presentation here in the ED. Currently, sedated with ativan and haldol.   "

## 2020-06-20 NOTE — H&P
"[facility] Medicine H&P Note     Admitting Team: [facility] Hospitalist Team A  Attending Physician: Melvin  Resident: Marisela  Intern: Kai     Date of Admit: 6/19/2020    Chief Complaint     Suicidal attempt last night    Subjective:      History of Present Illness:  Patient is extremely lethargic secondary to sedating and antipsychotic medications.     History Per ENT and ED staff note:   Jean Pierre Worrell is a 39 y.o. female with history of bipolar disorder who presents to the ED due to suicidal ideations. Per EMS, patient was found by significant other hanging with an extension cord around her neck. Upon evaluation, patient reports she is hearing things that sound like "screaming." History is limited due to patient's mental status.  Patient noted to have strangulation marks on her neck. CT neck was performed to further evaluate shows trace pneumomediastinum. Per ED physician pateint with strong voice upon presentation- no hoarseness. No stridor, stertor, or labored breathing since presentation here in the ED. Currently, sedated with ativan and haldol.     Past Medical History:  History reviewed. No pertinent past medical history.    Past Surgical History:  Past Surgical History:   Procedure Laterality Date    BARTHOLIN GLAND CYST EXCISION         Allergies:  Review of patient's allergies indicates:  No Known Allergies    Home Medications:  Prior to Admission medications    Medication Sig Start Date End Date Taking? Authorizing Provider   prenatal vit-iron fumarate-FA 27-1 mg Tab Take 1 tablet by mouth once daily. 3/19/13 10/7/19  Mena Connolly MD   risperiDONE (RISPERDAL) 1 MG tablet Take 1 tablet (1 mg total) by mouth 2 (two) times daily. 11/17/18 12/17/18  Antonio Brown MD       Family History:  History reviewed. No pertinent family history.    Social History:  Social History     Tobacco Use    Smoking status: Current Some Day Smoker     Packs/day: 0.50     Types: Cigarettes    Smokeless tobacco: Current " "User   Substance Use Topics    Alcohol use: Yes     Alcohol/week: 0.8 standard drinks     Types: 1 Standard drinks or equivalent per week     Comment: drink about 4/5 cans a week    Drug use: No       Review of Systems:  Review of systems not obtained due to patient factors sedation.     Health Maintaince :   Primary Care Physician: n/a    Immunizations:   unk    Cancer Screening:  unk     Objective:   Last 24 Hour Vital Signs:  BP  Min: 124/83  Max: 135/84  Temp  Av.3 °F (36.8 °C)  Min: 98.3 °F (36.8 °C)  Max: 98.3 °F (36.8 °C)  Pulse  Av.5  Min: 56  Max: 75  Resp  Avg: 15  Min: 14  Max: 16  SpO2  Av %  Min: 99 %  Max: 99 %  Height  Av' 2" (157.5 cm)  Min: 5' 2" (157.5 cm)  Max: 5' 2" (157.5 cm)  Weight  Av.2 kg (135 lb)  Min: 61.2 kg (135 lb)  Max: 61.2 kg (135 lb)  Body mass index is 24.69 kg/m².  No intake/output data recorded.    Physical Examination:  Constitutional: She appears well-developed and well-nourished. She is not diaphoretic. Sleeping soundly. Does not respond to sternal rub  HENT:   Head: Normocephalic and atraumatic.   Eyes: Conjunctivae and EOM are normal. Pupils are equal, round, and reactive to light.   Neck: Normal range of motion. Neck supple.   Cardiovascular: Normal rate, regular rhythm, normal heart sounds and intact distal pulses.   Pulmonary/Chest: Breath sounds normal. No respiratory distress.   Abdominal: Soft. Bowel sounds are normal. She exhibits no distension. There is no abdominal tenderness.   Musculoskeletal: Normal range of motion. No edema.   Neurological: She is alert. She has normal strength.   Skin: Skin is warm and dry.   Strangulation marks to neck.       Laboratory:  Most Recent Data:  CBC:   Lab Results   Component Value Date    WBC 16.21 (H) 2020    HGB 14.5 2020    HCT 40.9 2020     2020    MCV 97 2020    RDW 13.6 2020       BMP:   Lab Results   Component Value Date     2020    K 3.2 (L) " 06/19/2020     06/19/2020    CO2 21 (L) 06/19/2020    BUN 8 06/19/2020    CREATININE 0.7 06/19/2020    GLU 95 06/19/2020    CALCIUM 9.5 06/19/2020     LFTs:   Lab Results   Component Value Date    PROT 7.5 06/19/2020    ALBUMIN 4.1 06/19/2020    BILITOT 1.4 (H) 06/19/2020    AST 24 06/19/2020    ALKPHOS 99 06/19/2020    ALT 10 06/19/2020     Coags: No results found for: INR, PROTIME, PTT  FLP: No results found for: CHOL, HDL, LDLCALC, TRIG, CHOLHDL  DM:   Lab Results   Component Value Date    CREATININE 0.7 06/19/2020     Thyroid:   Lab Results   Component Value Date    TSH 1.398 06/19/2020     Anemia: No results found for: IRON, TIBC, FERRITIN, CTPTZIAB21, FOLATE  Cardiac: No results found for: TROPONINI, CKTOTAL, CKMB, BNP  Urinalysis:   Lab Results   Component Value Date    LABURIN  11/17/2018     Multiple organisms isolated. None in predominance.  Repeat if    LABURIN clinically necessary. 11/17/2018    COLORU Yellow 06/19/2020    SPECGRAV >=1.030 (A) 06/19/2020    NITRITE Negative 06/19/2020    KETONESU 1+ (A) 06/19/2020    UROBILINOGEN 1.0 06/19/2020    WBCUA 100 (H) 06/19/2020       Trended Lab Data:  Recent Labs   Lab 06/19/20  2312   WBC 16.21*   HGB 14.5   HCT 40.9      MCV 97   RDW 13.6      K 3.2*      CO2 21*   BUN 8   CREATININE 0.7   GLU 95   PROT 7.5   ALBUMIN 4.1   BILITOT 1.4*   AST 24   ALKPHOS 99   ALT 10       Trended Cardiac Data:  No results for input(s): TROPONINI, CKTOTAL, CKMB, BNP in the last 168 hours.    Microbiology Data:  Urine culture in process    Other Results:  Radiology:  Imaging Results          CT Neck Chest With Contrast (XPD) (Final result)  Result time 06/20/20 01:04:50    Final result by Erasto Hernandez MD (06/20/20 01:04:50)                 Impression:      Trace anterior pneumomediastinum.      Electronically signed by: Erasto Hernandez MD  Date:    06/20/2020  Time:    01:04             Narrative:    EXAMINATION:  CT NECK CHEST WITH CONTRAST  (XPD)    CLINICAL HISTORY:  Strangulation injury;    TECHNIQUE:  CT neck and chest were obtained following administration of 75 cc Omnipaque 350 IV contrast.    COMPARISON:  None.    FINDINGS:  Visualized portion of the brain shows no significant abnormalities.  Orbits are normal in appearance.  Visualized paranasal sinuses and mastoid air cells are essentially clear.  Parotid glands and submandibular glands are symmetric in size.  Thyroid gland is unremarkable.  No evidence of prevertebral soft tissue swelling.  Epiglottis is normal in thickness.  No acute osseous abnormality identified.  Several broken dentition, dental caries, and periapical lucencies are seen.    Trace pneumomediastinum is seen anteriorly.  Airways are patent.  Lungs are clear and symmetrically expanded.  No evidence of focal consolidation, pneumothorax, or pleural effusion.  Heart is normal in size without pericardial effusion.  Aorta is nonaneurysmal.  No significant abnormalities are seen along the esophageal course.    Partially visualized upper abdominal structures show no significant or acute abnormalities.                                   Assessment:     Jean Pierre Worrell is a 39 y.o. female with:  Patient Active Problem List    Diagnosis Date Noted    Laryngeal trauma, initial encounter 06/20/2020        Plan:     Laryngeal Trauma 2/2 Suicidal Attempt  -Patient attempted strangulation/hanging with electrical cords  -CT with evidence of trace anterior pneumomediastinum  -Vitals wnl. Basic labs wnl  -ENT performed laryngoscopy and recommending close monitoring in ICU  -Decadron 12 mg IV now and 8mg IV in 8 hours. Elevated head of bed and rest voice  -If patient remains stable, will likely clear medically for further psych care    Suicidal Attempt w/ History of BPD  -Patient sedated in ED with B52 and Ativan  -Nicole consulted. Will f/u recs. Home regimen unclear  -Anticipate IP psych placement tomorrow  -Will administer antipsychotic meds  PRN      UTI  -UA with 1+ leukocytes. Culture pending  -Continue Iv rocephin while inpatient. Will de-escalate as appropriate on discharge    VTE Px: Lov  Diet: NPO  Dispo:  Admit to ICU for close monitoring of airway. Psych consult and then likely medical clearance for IP psych placement    Code Status:     Full    Lan Quinn MD  hospitals Internal Medicine HO-I    hospitals Medicine Hospitalist Pager numbers:   hospitals Hospitalist Medicine Team A (Nancy/Melvin): [phone]  hospitals Hospitalist Medicine Team B (Afsanhe/Davey):  [phone]

## 2020-06-20 NOTE — SUBJECTIVE & OBJECTIVE
Medications:  Continuous Infusions:  Scheduled Meds:   dexamethasone  12 mg Intravenous ED 1 Time     PRN Meds:     No current facility-administered medications on file prior to encounter.      Current Outpatient Medications on File Prior to Encounter   Medication Sig    prenatal vit-iron fumarate-FA 27-1 mg Tab Take 1 tablet by mouth once daily.    risperiDONE (RISPERDAL) 1 MG tablet Take 1 tablet (1 mg total) by mouth 2 (two) times daily.       Review of patient's allergies indicates:  No Known Allergies    History reviewed. No pertinent past medical history.  Past Surgical History:   Procedure Laterality Date    BARTHOLIN GLAND CYST EXCISION       Family History     None        Tobacco Use    Smoking status: Current Some Day Smoker     Packs/day: 0.50     Types: Cigarettes    Smokeless tobacco: Current User   Substance and Sexual Activity    Alcohol use: Yes     Alcohol/week: 0.8 standard drinks     Types: 1 Standard drinks or equivalent per week     Comment: drink about 4/5 cans a week    Drug use: No    Sexual activity: Not on file     Review of Systems  Objective:     Vital Signs (Most Recent):  Temp: 98.3 °F (36.8 °C) (06/19/20 2207)  Pulse: 75 (06/19/20 2207)  Resp: 16 (06/19/20 2207)  BP: 124/83 (06/19/20 2207)  SpO2: 99 % (06/19/20 2207) Vital Signs (24h Range):  Temp:  [98.3 °F (36.8 °C)] 98.3 °F (36.8 °C)  Pulse:  [75] 75  Resp:  [16] 16  SpO2:  [99 %] 99 %  BP: (124)/(83) 124/83     Weight: 61.2 kg (135 lb)  Body mass index is 24.69 kg/m².        Physical Exam     Vitals:    06/19/20 2207   BP: 124/83   Pulse: 75   Resp: 16   Temp: 98.3 °F (36.8 °C)       Constitutional: Resting comfortably in bed;  NAD.  Eyes: EOM I Bilaterally  Head/Face: Normocephalic.  Negative paranasal sinus pressure/tenderness.  Salivary glands WNL.  House Brackmann I Bilaterally.    Nose: No gross nasal septal deviation. Inferior Turbinates 3+ bilaterally. No septal perforation. No masses/lesions. External nasal skin  without masses/lesions.  Oral Cavity: Gingiva/lips WNL.  FOM Soft, no masses palpated. Oral Tongue mobile. Hard Palate WNL.   Oropharynx: BOT WNL. No masses/lesions noted. Tonsillar fossa/pharyngeal wall without lesions. Posterior oropharynx WNL.  Soft palate without masses. Midline uvula.   Neck/Lymphatic: + linear ecchymotic circumferential strangulation markings around neck; no crepitus noted.  No LAD I-VI bilaterally.  No thyromegaly.  No masses noted on exam.    Mirror laryngoscopy/nasopharyngoscopy: Active gag reflex.  Unable to perform.    Neuro/Psychiatric: AOx3.  Normal mood and affect.   Cardiovascular: Normal carotid pulses bilaterally, no increasing jugular venous distention noted at cervical region bilaterally.    Respiratory: Normal respiratory effort, no stridor, no retractions noted.      Significant Labs:  COVID-negative  BMP:   Recent Labs   Lab 06/19/20  2312   GLU 95      CO2 21*   BUN 8   CREATININE 0.7   CALCIUM 9.5     CBC:   Recent Labs   Lab 06/19/20  2312   WBC 16.21*   RBC 4.20   HGB 14.5   HCT 40.9      MCV 97   MCH 34.5*   MCHC 35.5       Significant Diagnostics:  CT soft tissue neck/chest: trace pneumomediastinum; no significant airway edema; laryngeal framework intact without fracture.

## 2020-06-20 NOTE — NURSING
LR fluid with Mag/K added ordered but medication was unavailable until 0600; morning labs showed K and Mag in normal range. DC'd by MD Quinn

## 2020-06-21 VITALS
DIASTOLIC BLOOD PRESSURE: 77 MMHG | BODY MASS INDEX: 22.52 KG/M2 | WEIGHT: 122.38 LBS | TEMPERATURE: 97 F | OXYGEN SATURATION: 99 % | HEIGHT: 62 IN | HEART RATE: 60 BPM | RESPIRATION RATE: 20 BRPM | SYSTOLIC BLOOD PRESSURE: 135 MMHG

## 2020-06-21 PROBLEM — E87.6 HYPOKALEMIA: Status: RESOLVED | Noted: 2020-06-20 | Resolved: 2020-06-21

## 2020-06-21 LAB
ALBUMIN SERPL BCP-MCNC: 3.6 G/DL (ref 3.5–5.2)
ALP SERPL-CCNC: 84 U/L (ref 55–135)
ALT SERPL W/O P-5'-P-CCNC: 12 U/L (ref 10–44)
ANION GAP SERPL CALC-SCNC: 10 MMOL/L (ref 8–16)
AST SERPL-CCNC: 30 U/L (ref 10–40)
BACTERIA UR CULT: NO GROWTH
BASOPHILS # BLD AUTO: 0.01 K/UL (ref 0–0.2)
BASOPHILS NFR BLD: 0.1 % (ref 0–1.9)
BILIRUB SERPL-MCNC: 1.3 MG/DL (ref 0.1–1)
BUN SERPL-MCNC: 9 MG/DL (ref 6–20)
CALCIUM SERPL-MCNC: 8.9 MG/DL (ref 8.7–10.5)
CHLORIDE SERPL-SCNC: 107 MMOL/L (ref 95–110)
CO2 SERPL-SCNC: 23 MMOL/L (ref 23–29)
CREAT SERPL-MCNC: 0.8 MG/DL (ref 0.5–1.4)
DIFFERENTIAL METHOD: ABNORMAL
EOSINOPHIL # BLD AUTO: 0 K/UL (ref 0–0.5)
EOSINOPHIL NFR BLD: 0.1 % (ref 0–8)
ERYTHROCYTE [DISTWIDTH] IN BLOOD BY AUTOMATED COUNT: 14.2 % (ref 11.5–14.5)
EST. GFR  (AFRICAN AMERICAN): >60 ML/MIN/1.73 M^2
EST. GFR  (NON AFRICAN AMERICAN): >60 ML/MIN/1.73 M^2
FOLATE SERPL-MCNC: 7 NG/ML (ref 4–24)
GLUCOSE SERPL-MCNC: 84 MG/DL (ref 70–110)
HCT VFR BLD AUTO: 41.5 % (ref 37–48.5)
HGB BLD-MCNC: 14.2 G/DL (ref 12–16)
IMM GRANULOCYTES # BLD AUTO: 0.04 K/UL (ref 0–0.04)
IMM GRANULOCYTES NFR BLD AUTO: 0.3 % (ref 0–0.5)
LYMPHOCYTES # BLD AUTO: 3 K/UL (ref 1–4.8)
LYMPHOCYTES NFR BLD: 24 % (ref 18–48)
MCH RBC QN AUTO: 34.2 PG (ref 27–31)
MCHC RBC AUTO-ENTMCNC: 34.2 G/DL (ref 32–36)
MCV RBC AUTO: 100 FL (ref 82–98)
MONOCYTES # BLD AUTO: 1.1 K/UL (ref 0.3–1)
MONOCYTES NFR BLD: 8.6 % (ref 4–15)
NEUTROPHILS # BLD AUTO: 8.2 K/UL (ref 1.8–7.7)
NEUTROPHILS NFR BLD: 66.9 % (ref 38–73)
NRBC BLD-RTO: 0 /100 WBC
PLATELET # BLD AUTO: 291 K/UL (ref 150–350)
PMV BLD AUTO: 9.2 FL (ref 9.2–12.9)
POTASSIUM SERPL-SCNC: 3.6 MMOL/L (ref 3.5–5.1)
PROT SERPL-MCNC: 6.6 G/DL (ref 6–8.4)
RBC # BLD AUTO: 4.15 M/UL (ref 4–5.4)
SODIUM SERPL-SCNC: 140 MMOL/L (ref 136–145)
VIT B12 SERPL-MCNC: 350 PG/ML (ref 210–950)
WBC # BLD AUTO: 12.29 K/UL (ref 3.9–12.7)

## 2020-06-21 PROCEDURE — 63600175 PHARM REV CODE 636 W HCPCS: Performed by: STUDENT IN AN ORGANIZED HEALTH CARE EDUCATION/TRAINING PROGRAM

## 2020-06-21 PROCEDURE — 25000003 PHARM REV CODE 250: Performed by: STUDENT IN AN ORGANIZED HEALTH CARE EDUCATION/TRAINING PROGRAM

## 2020-06-21 PROCEDURE — 82607 VITAMIN B-12: CPT

## 2020-06-21 PROCEDURE — G0378 HOSPITAL OBSERVATION PER HR: HCPCS

## 2020-06-21 PROCEDURE — 36415 COLL VENOUS BLD VENIPUNCTURE: CPT

## 2020-06-21 PROCEDURE — 85025 COMPLETE CBC W/AUTO DIFF WBC: CPT

## 2020-06-21 PROCEDURE — 96376 TX/PRO/DX INJ SAME DRUG ADON: CPT

## 2020-06-21 PROCEDURE — 25000003 PHARM REV CODE 250: Performed by: PSYCHIATRY & NEUROLOGY

## 2020-06-21 PROCEDURE — 80053 COMPREHEN METABOLIC PANEL: CPT

## 2020-06-21 PROCEDURE — 82746 ASSAY OF FOLIC ACID SERUM: CPT

## 2020-06-21 RX ORDER — OLANZAPINE 5 MG/1
5 TABLET ORAL 2 TIMES DAILY
Qty: 60 TABLET | Refills: 0 | Status: SHIPPED | OUTPATIENT
Start: 2020-06-21 | End: 2020-07-21

## 2020-06-21 RX ORDER — ESCITALOPRAM OXALATE 5 MG/1
10 TABLET ORAL DAILY
Qty: 60 TABLET | Refills: 0 | Status: SHIPPED | OUTPATIENT
Start: 2020-06-22 | End: 2020-07-22

## 2020-06-21 RX ORDER — AMOXICILLIN AND CLAVULANATE POTASSIUM 875; 125 MG/1; MG/1
1 TABLET, FILM COATED ORAL EVERY 12 HOURS
Qty: 8 TABLET | Refills: 0 | Status: SHIPPED | OUTPATIENT
Start: 2020-06-21 | End: 2020-06-25

## 2020-06-21 RX ADMIN — AMPICILLIN SODIUM AND SULBACTAM SODIUM 3 G: 2; 1 INJECTION, POWDER, FOR SOLUTION INTRAMUSCULAR; INTRAVENOUS at 03:06

## 2020-06-21 RX ADMIN — AMPICILLIN SODIUM AND SULBACTAM SODIUM 3 G: 2; 1 INJECTION, POWDER, FOR SOLUTION INTRAMUSCULAR; INTRAVENOUS at 08:06

## 2020-06-21 RX ADMIN — ACETAMINOPHEN 650 MG: 325 TABLET ORAL at 05:06

## 2020-06-21 RX ADMIN — ACETAMINOPHEN 650 MG: 325 TABLET ORAL at 12:06

## 2020-06-21 RX ADMIN — ACETAMINOPHEN 650 MG: 325 TABLET ORAL at 01:06

## 2020-06-21 RX ADMIN — ESCITALOPRAM OXALATE 5 MG: 5 TABLET, FILM COATED ORAL at 08:06

## 2020-06-21 RX ADMIN — OLANZAPINE 5 MG: 2.5 TABLET, FILM COATED ORAL at 08:06

## 2020-06-21 NOTE — PLAN OF CARE
Discharge orders noted     TN placed ADT 22 order with Centralized Placement Team  [phone] option 5-for In Patient Psych Placement     [facility] is holding a bed for the patient, TN informed CPT     CPT will contact floor nurse with transportation information     Floor nurse to call report to [phone], Transfer packet with original PEC left at nurses station for floor nurse     TN attempted to call pt only listed emergency contact:    Melanie Coto Relative     [phone]   There was no answer and no voice mail    Pt's nurse will go over medications/signs and symptoms prior to discharge       06/21/20 1244   Final Note   Assessment Type Final Discharge Note   Anticipated Discharge Disposition Psych  ([facility])   What phone number can be called within the next 1-3 days to see how you are doing after discharge? [phone]   Hospital Follow Up  Appt(s) scheduled? No  (d/c to [facility])   Right Care Referral Info   Post Acute Recommendation Other   Referral Type [facility]   Facility Name [facility]     Violeta Jama, RN Transitional Navigator  [phone]

## 2020-06-21 NOTE — PROGRESS NOTES
Discharge orders noted    TN placed ADT 22 order with Centralized Placement Team  [phone] option 5-for In Patient Psych Placement    [facility] is holding a bed for the patient, TN informed CPT    CPT will contact floor nurse to let them know who to call report to and when transport will arrive

## 2020-06-21 NOTE — PROGRESS NOTES
[facility] Hospital Medicine Progress Note    Primary Team: [facility] Hospitalist Team A  Attending Physician: Ariadna Charles MD  Resident: Otilia  Intern: Melvin    Subjective:      Ms. Worrell was lying in bed tearful. She reports she did well overnight but is concerned with her mental status given the recent events. We discussed that she would be going to a behavioral health hospital where they would work with her to discover the appropriate therapy moving forward. She denied any SOB, throat pain, nausea, vomiting, fever, chills, CP, or difficulty swallowing/speaking.       Objective:     Last 24 Hour Vital Signs:  BP  Min: 117/62  Max: 141/79  Temp  Av.9 °F (36.6 °C)  Min: 95.8 °F (35.4 °C)  Max: 98.7 °F (37.1 °C)  Pulse  Av.5  Min: 58  Max: 95  Resp  Av  Min: 14  Max: 20  SpO2  Av.3 %  Min: 95 %  Max: 100 %  I/O last 3 completed shifts:  In: 895 [P.O.:445; IV Piggyback:450]  Out: 800 [Urine:800]    Physical Examination:  Examination  General: Patient sitting comfortably in NAD  HEENT: normocephalic; throat with markings circumferentially around neck. No signs of infection.  Cardiac: RRR, no murmurs appreciated, no extra heart sounds  Pulmonary/Chest: CTAB, symmetric chest rise, no wheezing or crackles  GI: Soft, non tender, non distended, normoactive bowel sounds  Extremities: no edema, clubbing, or cyanosis  Skin: dry, warm, intact. No bruising or rashes.  Neuro: Alert and oriented, moving all extremities    Laboratory:  Laboratory Data   Recent Labs   Lab 20  2312 20  0500 20  0515 20  0456   WBC 16.21* 10.33  --  12.29   HGB 14.5 15.0  --  14.2   HCT 40.9 46.4  --  41.5    327  --  291   MCV 97 106*  --  100*    136  --  140   K 3.2* 4.6  --  3.6    109  --  107   CO2 21* 15*  --  23   BUN 8 7  --  9   CREATININE 0.7 0.7  --  0.8   GLU 95 74  --  84   CALCIUM 9.5 9.2  --  8.9   PROT 7.5 7.7  --  6.6   ALBUMIN 4.1 4.0  --  3.6   MG  --   --  2.1  --     AST 24 53*  --  30   ALT 10 13  --  12   ALKPHOS 99 100  --  84     Invalid input(s): POCTGLU  Recent Labs   Lab 06/20/20  0505   HGBA1C 4.7     Microbiology Data   Urine culture = negative    Radiology Data  CT Neck Chest  (6/19/2020)  Visualized portion of the brain shows no significant abnormalities.  Orbits are normal in appearance.  Visualized paranasal sinuses and mastoid air cells are essentially clear.  Parotid glands and submandibular glands are symmetric in size.  Thyroid gland is unremarkable.  No evidence of prevertebral soft tissue swelling.  Epiglottis is normal in thickness.  No acute osseous abnormality identified.  Several broken dentition, dental caries, and periapical lucencies are seen.     Trace pneumomediastinum is seen anteriorly.  Airways are patent.  Lungs are clear and symmetrically expanded.  No evidence of focal consolidation, pneumothorax, or pleural effusion.  Heart is normal in size without pericardial effusion.  Aorta is nonaneurysmal.  No significant abnormalities are seen along the esophageal course.     Partially visualized upper abdominal structures show no significant or acute abnormalities.    Current Medications:     Infusions:       Scheduled:   ampicillin-sulbactim (UNASYN) IVPB  3 g Intravenous Q6H    escitalopram oxalate  5 mg Oral Daily    OLANZapine  5 mg Oral BID        PRN:  acetaminophen, sodium chloride 0.9%    Antibiotics and Day Number of Therapy:  Unasyn    Assessment:     Jean Pierre Worrell is a 39 y.o.female with  Patient Active Problem List    Diagnosis Date Noted    Laryngeal trauma, initial encounter 06/20/2020    Suicide attempt 06/20/2020    Bipolar disorder 06/20/2020    Pneumomediastinum 06/20/2020    Hypokalemia 06/20/2020    UTI (urinary tract infection) 06/20/2020    Supraglottic edema 06/20/2020    Aspiration pneumonia 06/20/2020    Intentional self-harm by hanging 06/20/2020        Plan:     Laryngeal Trauma 2/2 Suicidal Attempt  - Patient  attempted strangulation/hanging with electrical cords  - CT =evidence of trace anterior pneumomediastinum  - Vitals wnl. Basic labs wnl  - ENT = performed laryngoscopy   - Completed Decadron 12 mg IV then 8mg IV; elevated head of bed and rest voice  - Patient has remained stable without changes in oxygen or hemodynamics     Suicidal Attempt w/ History of BPD  - Patient sedated in ED with B52 and Ativan  - Pydebbi consulted: started risperdal and lexapro; plan for transfer to behavioral hospital  - Anticipate IP psych placement   - Will administer antipsychotic meds PRN       UTI and Aspiration Pneumonia  - UA with 1+ leukocytes I Urine culture = no growth  - Continue unasyn while inpatient  - Discharge with augmentin    Code Status: Full Code  DVT Prophylaxis: Lovenox  Diet: Regular diet  Disposition: Patient medically clear for discharge for IP psychiatric placement    Natalya Charles MD  [facility], [facility]    [facility] Hospitalist Pager numbers:   [facility] Hospitalist Medicine Team A (Nancy/Melvin): [phone]  [facility] Hospitalist Medicine Team B (Afsaneh/Davey):  [phone]

## 2020-06-21 NOTE — DISCHARGE INSTRUCTIONS
Suicidal, 72-Hour Hold (English) View Edit Remove  Amoxicillin; Clavulanic Acid extended-release tablets (English) View Edit Remove  Escitalopram tablets (English) View Edit Remove  Olanzapine tablets (English) View Edit Remove  Suicide, Recognizing Warning Signs in Yourself (English) View Edit Remove

## 2020-06-21 NOTE — DISCHARGE SUMMARY
"[facility] Medicine Discharge Summary    Primary Team: [facility] Hospital[facility]  Attending Physician: Melvin  Resident: Otilia  Intern: Melvin    Date of Admit: 6/19/2020  Date of Discharge: 6/21/2020    Discharge to: [facility]  Condition: Stable    Discharge Diagnoses     Patient Active Problem List   Diagnosis    Laryngeal trauma, initial encounter    Suicide attempt    Bipolar disorder    Pneumomediastinum    UTI (urinary tract infection)    Supraglottic edema    Aspiration pneumonia    Intentional self-harm by hanging     Consultants and Procedures     Consultants:  ENT    Procedures:   Laryngoscopy    Imaging:  CT Neck Chest  (6/19/2020)  Visualized portion of the brain shows no significant abnormalities.  Orbits are normal in appearance.  Visualized paranasal sinuses and mastoid air cells are essentially clear.  Parotid glands and submandibular glands are symmetric in size.  Thyroid gland is unremarkable.  No evidence of prevertebral soft tissue swelling.  Epiglottis is normal in thickness.  No acute osseous abnormality identified.  Several broken dentition, dental caries, and periapical lucencies are seen.     Trace pneumomediastinum is seen anteriorly.  Airways are patent.  Lungs are clear and symmetrically expanded.  No evidence of focal consolidation, pneumothorax, or pleural effusion.  Heart is normal in size without pericardial effusion.  Aorta is nonaneurysmal.  No significant abnormalities are seen along the esophageal course.     Partially visualized upper abdominal structures show no significant or acute abnormalities.    Brief History of Present Illness      Jean Pierre Worrell is a 39 y.o. female with history of bipolar disorder who presents to the ED due to suicidal ideations. Per EMS, patient was found by significant other hanging with an extension cord around her neck. Upon evaluation, patient reports she is hearing things that sound like "screaming." History " was limited due to patient's mental status.  Patient noted to have strangulation marks on her neck. CT neck was performed to further evaluate shows trace pneumomediastinum. Per ED physician pateint with strong voice upon presentation- no hoarseness. No stridor, stertor, or labored breathing since presentation here in the ED.     For the full HPI please refer to the History & Physical from this admission.    Hospital Course By Problem with Pertinent Findings     Laryngeal Trauma 2/2 Suicidal Attempt  - Patient attempted strangulation/hanging with electrical cords  - CT =evidence of trace anterior pneumomediastinum  - Vitals wnl. Basic labs wnl  - ENT = performed laryngoscopy   - Completed Decadron 12 mg IV then 8mg IV; elevated head of bed and rest voice  - Patient has remained stable without changes in oxygen or hemodynamics     Suicidal Attempt w/ History of BPD  - Patient sedated in ED with B52 and Ativan  - Pysch consulted: started risperdal and lexapro; plan for transfer to behavioral hospital  - Discharging to IP psych placement       UTI and Aspiration Pneumonia  - UA with 1+ leukocytes I Urine culture = no growth  - Continue unasyn while inpatient  - Discharge with augmentin for 4 more days    Discharge Medications      Jean Pierre Worrell   Home Medication Instructions HANNA:[id number]    Printed on:06/21/20 7861   Medication Information                      amoxicillin-clavulanate 875-125mg (AUGMENTIN) 875-125 mg per tablet  Take 1 tablet by mouth every 12 (twelve) hours. for 4 days             escitalopram oxalate (LEXAPRO) 5 MG Tab  Take 2 tablets (10 mg total) by mouth once daily.             OLANZapine (ZYPREXA) 5 MG tablet  Take 1 tablet (5 mg total) by mouth 2 (two) times a day.             prenatal vit-iron fumarate-FA 27-1 mg Tab  Take 1 tablet by mouth once daily.             risperiDONE (RISPERDAL) 1 MG tablet  Take 1 tablet (1 mg total) by mouth 2 (two) times daily.                 Discharge  Information:   Diet:  Regular diet    Physical Activity:  As tolerated             Instructions:  1. Take all medications as prescribed  2. Keep all follow-up appointments  3. Return to the hospital or call your primary care physicians if any worsening symptoms such as fever, chest pain, shortness of breath, return of symptoms, or any other concerns.     Follow-Up Appointments:  PCP  Physician at [facility]    Natalya Charles MD  [facility] Internal Medicine Resident, [facility]

## 2020-06-21 NOTE — PLAN OF CARE
Oriented X4. VS stable. Safety maintained. Meds given per MAR. IV antibiotics infusing. Sitter @ bedside. Pain treated with PRN meds. Sleeping for most of night. Arouses to voice or touch. Encouraged to call for assistance. Call light in reach. Bed low and locked. SR up X 2. Bed alarm on. Will continue to monitor.

## 2020-06-22 LAB
HAV IGM SERPL QL IA: NEGATIVE
HBV CORE IGM SERPL QL IA: NEGATIVE
HBV SURFACE AG SERPL QL IA: NEGATIVE
HCV AB SERPL QL IA: NEGATIVE
HIV 1+2 AB+HIV1 P24 AG SERPL QL IA: NEGATIVE

## 2023-04-21 ENCOUNTER — OFFICE VISIT (OUTPATIENT)
Dept: URGENT CARE | Facility: CLINIC | Age: 43
End: 2023-04-21
Payer: MEDICAID

## 2023-04-21 VITALS
DIASTOLIC BLOOD PRESSURE: 80 MMHG | HEART RATE: 70 BPM | OXYGEN SATURATION: 98 % | TEMPERATURE: 98 F | RESPIRATION RATE: 18 BRPM | BODY MASS INDEX: 23.19 KG/M2 | HEIGHT: 62 IN | SYSTOLIC BLOOD PRESSURE: 123 MMHG | WEIGHT: 126 LBS

## 2023-04-21 DIAGNOSIS — K08.89 PAIN, DENTAL: Primary | ICD-10-CM

## 2023-04-21 DIAGNOSIS — K04.7 DENTAL INFECTION: ICD-10-CM

## 2023-04-21 PROCEDURE — 99203 PR OFFICE/OUTPT VISIT, NEW, LEVL III, 30-44 MIN: ICD-10-PCS | Mod: S$GLB,,,

## 2023-04-21 PROCEDURE — 99203 OFFICE O/P NEW LOW 30 MIN: CPT | Mod: S$GLB,,,

## 2023-04-21 RX ORDER — AMOXICILLIN AND CLAVULANATE POTASSIUM 875; 125 MG/1; MG/1
1 TABLET, FILM COATED ORAL EVERY 12 HOURS
Qty: 14 TABLET | Refills: 0 | Status: SHIPPED | OUTPATIENT
Start: 2023-04-21 | End: 2023-04-28

## 2023-04-21 RX ORDER — KETOROLAC TROMETHAMINE 30 MG/ML
30 INJECTION, SOLUTION INTRAMUSCULAR; INTRAVENOUS
Status: COMPLETED | OUTPATIENT
Start: 2023-04-21 | End: 2023-04-21

## 2023-04-21 RX ORDER — IBUPROFEN 800 MG/1
800 TABLET ORAL 3 TIMES DAILY
COMMUNITY
Start: 2023-02-15

## 2023-04-21 RX ADMIN — KETOROLAC TROMETHAMINE 30 MG: 30 INJECTION, SOLUTION INTRAMUSCULAR; INTRAVENOUS at 05:04

## 2023-04-21 NOTE — PROGRESS NOTES
"Subjective:      Patient ID: Jean Pierre Worrell is a 42 y.o. female.    Vitals:  height is 5' 2" (1.575 m) and weight is 57.2 kg (126 lb). Her temperature is 98 °F (36.7 °C). Her blood pressure is 123/80 and her pulse is 70. Her respiration is 18 and oxygen saturation is 98%.     Chief Complaint: Dental Pain    43 yo female patient presents to the clinic with dental pain. Pt reports that the pain started a couple of days ago. Pt reports that she had a tooth chip off on her left side and is having swelling and pain to her left lower gumline. Pt denies any fever, chills, nausea/vomiting.    Dental Pain   Associated symptoms include difficulty swallowing, facial pain, sinus pressure and thermal sensitivity.     HENT:  Positive for sinus pressure.     Objective:     Physical Exam   Constitutional: She is oriented to person, place, and time. She appears well-developed. She is cooperative.   HENT:   Head: Normocephalic and atraumatic.   Ears:   Right Ear: Hearing normal.   Left Ear: Hearing normal.   Nose: Nose normal. Right sinus exhibits no maxillary sinus tenderness and no frontal sinus tenderness. Left sinus exhibits no maxillary sinus tenderness and no frontal sinus tenderness.   Mouth/Throat: Uvula is midline, oropharynx is clear and moist and mucous membranes are normal. No trismus in the jaw. Abnormal dentition. Dental caries present. No uvula swelling.          Comments: Swelling and erythema to lower left gumline  Eyes: Conjunctivae and lids are normal.   Neck: Trachea normal and phonation normal. Neck supple.   Cardiovascular: Normal rate, regular rhythm, normal heart sounds and normal pulses.   Pulmonary/Chest: Effort normal and breath sounds normal.   Abdominal: Normal appearance and bowel sounds are normal. Soft.   Musculoskeletal: Normal range of motion.         General: Normal range of motion.   Neurological: She is alert and oriented to person, place, and time. She exhibits normal muscle tone.   Skin: Skin " is warm, dry and intact.   Psychiatric: Her speech is normal and behavior is normal. Judgment and thought content normal.   Nursing note and vitals reviewed.    Assessment:     1. Pain, dental    2. Dental infection      Plan:     Pain, dental  -     ketorolac injection 30 mg    Dental infection  -     amoxicillin-clavulanate 875-125mg (AUGMENTIN) 875-125 mg per tablet; Take 1 tablet by mouth every 12 (twelve) hours. for 7 days  Dispense: 14 tablet; Refill: 0    Patient no acute distress.  Vital signs reassuring.  Toradol injection given for pain.  Discussed treatment of dental infection with Augmentin and to follow up on Monday with a dentist. Discussed the importance of further evaluation if symptoms worsen. Patient stated verbal understanding.    Patient Instructions   DENTAL ISSUE:     Please take all your antibiotics as prescribed even if your are feeling better or your symptoms resolve.    ALTERNATE OVER-THE-COUNTER TYLENOL AND IBUPROFEN, AS NEEDED FOR DISCOMFORT.    Please follow up with a dentist as soon as possible for reevaluation/treatment of symptoms;  if you do not have one, you can call the number on back of your insurance card for assistance.    You can find a dentist that accepts Medicaid at [email or web address]    Please go to the ER for any worsening symptoms including: fever, facial swelling/redness, difficulty opening your mouth/breathing/swallowing or new symptoms or other concerns.

## 2023-04-21 NOTE — PATIENT INSTRUCTIONS
DENTAL ISSUE:     Please take all your antibiotics as prescribed even if your are feeling better or your symptoms resolve.    ALTERNATE OVER-THE-COUNTER TYLENOL AND IBUPROFEN, AS NEEDED FOR DISCOMFORT.    Please follow up with a dentist as soon as possible for reevaluation/treatment of symptoms;  if you do not have one, you can call the number on back of your insurance card for assistance.    You can find a dentist that accepts Medicaid at [email or web address]    Please go to the ER for any worsening symptoms including: fever, facial swelling/redness, difficulty opening your mouth/breathing/swallowing or new symptoms or other concerns.

## 2023-06-10 NOTE — PLAN OF CARE
[facility]    FACILITY TRANSFER ORDERS      Patient Name: Jean Pierre Worrell  YOB: 1980    PCP: Primary Doctor No   PCP Address: None  PCP Phone Number: None  PCP Fax: None    Encounter Date: 06/21/2020    Admit to: [facility]     Vital Signs:  Routine    Diagnoses:   Active Hospital Problems    Diagnosis  POA    *Pneumomediastinum [J98.2]  Yes    Laryngeal trauma, initial encounter [S19.81XA]  Yes    Suicide attempt [T14.91XA]  Yes    Bipolar disorder [F31.9]  Yes    UTI (urinary tract infection) [N39.0]  Yes    Supraglottic edema [J38.4]  Yes    Aspiration pneumonia [J69.0]  Yes    Intentional self-harm by hanging [X83.8XXA]  Yes      Resolved Hospital Problems    Diagnosis Date Resolved POA    Hypokalemia [E87.6] 06/21/2020 Yes     Allergies:Review of patient's allergies indicates:  No Known Allergies    Diet: regular diet    Activities: Activity as tolerated    Nursing: As per facility     Labs: As per facility       CONSULTS:    None    MISCELLANEOUS CARE:  None    WOUND CARE ORDERS  None    Medications: Review discharge medications with patient and family and provide education.      Current Discharge Medication List      START taking these medications    Details   amoxicillin-clavulanate 875-125mg (AUGMENTIN) 875-125 mg per tablet Take 1 tablet by mouth every 12 (twelve) hours. for 4 days  Qty: 8 tablet, Refills: 0      escitalopram oxalate (LEXAPRO) 5 MG Tab Take 2 tablets (10 mg total) by mouth once daily.  Qty: 60 tablet, Refills: 0      OLANZapine (ZYPREXA) 5 MG tablet Take 1 tablet (5 mg total) by mouth 2 (two) times a day.  Qty: 60 tablet, Refills: 0         CONTINUE these medications which have NOT CHANGED    Details   prenatal vit-iron fumarate-FA 27-1 mg Tab Take 1 tablet by mouth once daily.  Qty: 30 tablet, Refills: 0      risperiDONE (RISPERDAL) 1 MG tablet Take 1 tablet (1 mg total) by mouth 2 (two) times daily.  Qty: 60 tablet, Refills: 0                   _________________________________  Natalya Charles MD  06/21/2020           4 = No assist / stand by assistance

## 2023-12-07 DIAGNOSIS — M79.671 FOOT PAIN, BILATERAL: Primary | ICD-10-CM

## 2023-12-07 DIAGNOSIS — M79.672 FOOT PAIN, BILATERAL: Primary | ICD-10-CM

## 2025-04-10 DIAGNOSIS — Z12.4 ROUTINE CERVICAL SMEAR: Primary | ICD-10-CM

## 2025-06-08 ENCOUNTER — TELEPHONE (OUTPATIENT)
Dept: EMERGENCY MEDICINE | Facility: HOSPITAL | Age: 45
End: 2025-06-08

## 2025-06-08 ENCOUNTER — HOSPITAL ENCOUNTER (EMERGENCY)
Facility: HOSPITAL | Age: 45
Discharge: PSYCHIATRIC OR SUBSTANCE USE FACILITY | End: 2025-06-08
Attending: EMERGENCY MEDICINE
Payer: MEDICAID

## 2025-06-08 VITALS
HEIGHT: 63 IN | SYSTOLIC BLOOD PRESSURE: 153 MMHG | TEMPERATURE: 98 F | RESPIRATION RATE: 19 BRPM | BODY MASS INDEX: 22.66 KG/M2 | OXYGEN SATURATION: 100 % | DIASTOLIC BLOOD PRESSURE: 87 MMHG | HEART RATE: 62 BPM | WEIGHT: 127.88 LBS

## 2025-06-08 DIAGNOSIS — S99.922A FOOT INJURY, LEFT, INITIAL ENCOUNTER: ICD-10-CM

## 2025-06-08 DIAGNOSIS — R46.2 BIZARRE BEHAVIOR: ICD-10-CM

## 2025-06-08 DIAGNOSIS — R44.3 HALLUCINATIONS: ICD-10-CM

## 2025-06-08 DIAGNOSIS — F29 PSYCHOSIS, UNSPECIFIED PSYCHOSIS TYPE: Primary | ICD-10-CM

## 2025-06-08 DIAGNOSIS — S99.919A ANKLE INJURY, INITIAL ENCOUNTER: ICD-10-CM

## 2025-06-08 LAB
ABSOLUTE EOSINOPHIL (OHS): 0.01 K/UL
ABSOLUTE MONOCYTE (OHS): 1.48 K/UL (ref 0.3–1)
ABSOLUTE NEUTROPHIL COUNT (OHS): 13.09 K/UL (ref 1.8–7.7)
ALBUMIN SERPL BCP-MCNC: 4.6 G/DL (ref 3.5–5.2)
ALP SERPL-CCNC: 85 UNIT/L (ref 40–150)
ALT SERPL W/O P-5'-P-CCNC: 19 UNIT/L (ref 10–44)
AMPHET UR QL SCN: NEGATIVE
ANION GAP (OHS): 14 MMOL/L (ref 8–16)
APAP SERPL-MCNC: <3 UG/ML (ref 10–20)
AST SERPL-CCNC: 42 UNIT/L (ref 11–45)
B-HCG UR QL: NEGATIVE
BACTERIA #/AREA URNS AUTO: ABNORMAL /HPF
BARBITURATE SCN PRESENT UR: NEGATIVE
BASOPHILS # BLD AUTO: 0.05 K/UL
BASOPHILS NFR BLD AUTO: 0.3 %
BENZODIAZ UR QL SCN: NEGATIVE
BILIRUB SERPL-MCNC: 3.1 MG/DL (ref 0.1–1)
BILIRUB UR QL STRIP.AUTO: NEGATIVE
BUN SERPL-MCNC: 16 MG/DL (ref 6–20)
CALCIUM SERPL-MCNC: 9.8 MG/DL (ref 8.7–10.5)
CANNABINOIDS UR QL SCN: ABNORMAL
CHLORIDE SERPL-SCNC: 103 MMOL/L (ref 95–110)
CLARITY UR: ABNORMAL
CO2 SERPL-SCNC: 18 MMOL/L (ref 23–29)
COCAINE UR QL SCN: NEGATIVE
COLOR UR AUTO: ABNORMAL
CREAT SERPL-MCNC: 1.1 MG/DL (ref 0.5–1.4)
CREAT UR-MCNC: 324.9 MG/DL (ref 15–325)
CTP QC/QA: YES
ERYTHROCYTE [DISTWIDTH] IN BLOOD BY AUTOMATED COUNT: 13.9 % (ref 11.5–14.5)
ETHANOL SERPL-MCNC: <10 MG/DL
GFR SERPLBLD CREATININE-BSD FMLA CKD-EPI: >60 ML/MIN/1.73/M2
GLUCOSE SERPL-MCNC: 98 MG/DL (ref 70–110)
GLUCOSE UR QL STRIP: NEGATIVE
HCT VFR BLD AUTO: 40.7 % (ref 37–48.5)
HGB BLD-MCNC: 14.4 GM/DL (ref 12–16)
HGB UR QL STRIP: ABNORMAL
HOLD SPECIMEN: NORMAL
HYALINE CASTS UR QL AUTO: 0 /LPF (ref 0–1)
IMM GRANULOCYTES # BLD AUTO: 0.08 K/UL (ref 0–0.04)
IMM GRANULOCYTES NFR BLD AUTO: 0.5 % (ref 0–0.5)
KETONES UR QL STRIP: ABNORMAL
LEUKOCYTE ESTERASE UR QL STRIP: ABNORMAL
LYMPHOCYTES # BLD AUTO: 1.62 K/UL (ref 1–4.8)
MCH RBC QN AUTO: 33.4 PG (ref 27–31)
MCHC RBC AUTO-ENTMCNC: 35.4 G/DL (ref 32–36)
MCV RBC AUTO: 94 FL (ref 82–98)
METHADONE UR QL SCN: NEGATIVE
MICROSCOPIC COMMENT: ABNORMAL
NITRITE UR QL STRIP: NEGATIVE
NUCLEATED RBC (/100WBC) (OHS): 0 /100 WBC
OPIATES UR QL SCN: NEGATIVE
PCP UR QL: NEGATIVE
PH UR STRIP: 6 [PH]
PLATELET # BLD AUTO: 307 K/UL (ref 150–450)
PMV BLD AUTO: 9.6 FL (ref 9.2–12.9)
POTASSIUM SERPL-SCNC: 3.1 MMOL/L (ref 3.5–5.1)
PROT SERPL-MCNC: 8.6 GM/DL (ref 6–8.4)
PROT UR QL STRIP: ABNORMAL
RBC # BLD AUTO: 4.31 M/UL (ref 4–5.4)
RBC #/AREA URNS AUTO: >100 /HPF (ref 0–4)
RELATIVE EOSINOPHIL (OHS): 0.1 %
RELATIVE LYMPHOCYTE (OHS): 9.9 % (ref 18–48)
RELATIVE MONOCYTE (OHS): 9.1 % (ref 4–15)
RELATIVE NEUTROPHIL (OHS): 80.1 % (ref 38–73)
SODIUM SERPL-SCNC: 135 MMOL/L (ref 136–145)
SP GR UR STRIP: 1.02
SQUAMOUS #/AREA URNS AUTO: 17 /HPF
UROBILINOGEN UR STRIP-ACNC: NEGATIVE EU/DL
WBC # BLD AUTO: 16.33 K/UL (ref 3.9–12.7)
WBC #/AREA URNS AUTO: >100 /HPF (ref 0–5)
WBC CLUMPS UR QL AUTO: ABNORMAL

## 2025-06-08 PROCEDURE — 80053 COMPREHEN METABOLIC PANEL: CPT

## 2025-06-08 PROCEDURE — 87086 URINE CULTURE/COLONY COUNT: CPT

## 2025-06-08 PROCEDURE — 80307 DRUG TEST PRSMV CHEM ANLYZR: CPT

## 2025-06-08 PROCEDURE — 63600175 PHARM REV CODE 636 W HCPCS: Performed by: EMERGENCY MEDICINE

## 2025-06-08 PROCEDURE — 82077 ASSAY SPEC XCP UR&BREATH IA: CPT

## 2025-06-08 PROCEDURE — 80143 DRUG ASSAY ACETAMINOPHEN: CPT

## 2025-06-08 PROCEDURE — 99285 EMERGENCY DEPT VISIT HI MDM: CPT | Mod: 25

## 2025-06-08 PROCEDURE — 96372 THER/PROPH/DIAG INJ SC/IM: CPT | Performed by: EMERGENCY MEDICINE

## 2025-06-08 PROCEDURE — 96375 TX/PRO/DX INJ NEW DRUG ADDON: CPT

## 2025-06-08 PROCEDURE — 81025 URINE PREGNANCY TEST: CPT | Performed by: EMERGENCY MEDICINE

## 2025-06-08 PROCEDURE — 96374 THER/PROPH/DIAG INJ IV PUSH: CPT

## 2025-06-08 PROCEDURE — 81001 URINALYSIS AUTO W/SCOPE: CPT

## 2025-06-08 PROCEDURE — 25000003 PHARM REV CODE 250

## 2025-06-08 PROCEDURE — 63600175 PHARM REV CODE 636 W HCPCS: Mod: JZ,TB

## 2025-06-08 PROCEDURE — 85025 COMPLETE CBC W/AUTO DIFF WBC: CPT

## 2025-06-08 RX ORDER — DIPHENHYDRAMINE HYDROCHLORIDE 50 MG/ML
50 INJECTION, SOLUTION INTRAMUSCULAR; INTRAVENOUS
Status: COMPLETED | OUTPATIENT
Start: 2025-06-08 | End: 2025-06-08

## 2025-06-08 RX ORDER — HALOPERIDOL LACTATE 5 MG/ML
5 INJECTION, SOLUTION INTRAMUSCULAR
Status: COMPLETED | OUTPATIENT
Start: 2025-06-08 | End: 2025-06-08

## 2025-06-08 RX ORDER — LORAZEPAM 2 MG/ML
2 INJECTION INTRAMUSCULAR
Status: COMPLETED | OUTPATIENT
Start: 2025-06-08 | End: 2025-06-08

## 2025-06-08 RX ORDER — KETOROLAC TROMETHAMINE 30 MG/ML
15 INJECTION, SOLUTION INTRAMUSCULAR; INTRAVENOUS
Status: COMPLETED | OUTPATIENT
Start: 2025-06-08 | End: 2025-06-08

## 2025-06-08 RX ORDER — POTASSIUM CHLORIDE 20 MEQ/1
40 TABLET, EXTENDED RELEASE ORAL
Status: COMPLETED | OUTPATIENT
Start: 2025-06-08 | End: 2025-06-08

## 2025-06-08 RX ORDER — CEFTRIAXONE 1 G/1
1 INJECTION, POWDER, FOR SOLUTION INTRAMUSCULAR; INTRAVENOUS
Status: COMPLETED | OUTPATIENT
Start: 2025-06-08 | End: 2025-06-08

## 2025-06-08 RX ADMIN — POTASSIUM CHLORIDE 40 MEQ: 1500 TABLET, EXTENDED RELEASE ORAL at 11:06

## 2025-06-08 RX ADMIN — LORAZEPAM 2 MG: 2 INJECTION INTRAMUSCULAR; INTRAVENOUS at 11:06

## 2025-06-08 RX ADMIN — HALOPERIDOL LACTATE 5 MG: 5 INJECTION, SOLUTION INTRAMUSCULAR at 11:06

## 2025-06-08 RX ADMIN — KETOROLAC TROMETHAMINE 15 MG: 30 INJECTION, SOLUTION INTRAMUSCULAR; INTRAVENOUS at 11:06

## 2025-06-08 RX ADMIN — DIPHENHYDRAMINE HYDROCHLORIDE 50 MG: 50 INJECTION INTRAMUSCULAR; INTRAVENOUS at 11:06

## 2025-06-08 RX ADMIN — CEFTRIAXONE SODIUM 1 G: 1 INJECTION, POWDER, FOR SOLUTION INTRAMUSCULAR; INTRAVENOUS at 11:06

## 2025-06-08 NOTE — ED NOTES
"Patient awake and states "I can hear the people in the pipes talking,Trump can bring a Uhaul to your house and just kick you out, the illuminati is taking over everything"   "

## 2025-06-08 NOTE — ED PROVIDER NOTES
"Encounter Date: 6/8/2025       History     Chief Complaint   Patient presents with    Mental Health Problem     Attempted to climb out of 2nd story window b/c she believed she was locked in her apartment - EMS verified that she was not. EMS reports that they have responded to residence 2 other times for paranoid behavior. Presents awake, alert.     Patient is a 44-year-old female with no significant past medical history who presents to emergency room for bizarre behavior and paranoia.  EMS states that patient was seen climbing out of her 2nd story window by several bed sheets and fell on her feet.  Patient states that she was attempting to climb out the window due to a door being locked in the house.  She also reports that she is hearing someone saying "they have your son!" EMS states that [place] police department has been called to the scene several times this week for bizarre behavior.  Patient states that she is on daily medications, but is unsure which ones.  Denies suicide or homicidal ideations.  However, she does endorse "seeing dead people" and "having visions of the future." Endorses pain to left ankle after fall.  Unsure whether or not she hit her head.  She was ambulatory on scene.  No weakness, numbness, tingling, back pain, visual changes, nausea, vomiting, or others at this time.  No medications given prior to arrival.    The history is provided by the patient and the EMS personnel. No  was used.     Review of patient's allergies indicates:  No Known Allergies  History reviewed. No pertinent past medical history.  Past Surgical History:   Procedure Laterality Date    BARTHOLIN GLAND CYST EXCISION       No family history on file.  Social History[1]  Review of Systems   Unable to perform ROS: Psychiatric disorder   Musculoskeletal:  Positive for arthralgias (left ankle).   Psychiatric/Behavioral:  Positive for behavioral problems and hallucinations. The patient is nervous/anxious.  "       Physical Exam     Initial Vitals [06/08/25 0923]   BP Pulse Resp Temp SpO2   (!) 141/76 78 16 98.1 °F (36.7 °C) 98 %      MAP       --         Physical Exam    Nursing note and vitals reviewed.  Constitutional: She is not diaphoretic. No distress.   HENT:   Right Ear: External ear normal.   Left Ear: External ear normal.   Eyes: Conjunctivae and EOM are normal.   Neck: Neck supple.   Normal range of motion.  Pulmonary/Chest: No respiratory distress.   Musculoskeletal:         General: No edema.      Cervical back: Normal range of motion and neck supple.      Left ankle: Swelling present. Tenderness present. Decreased range of motion.      Left foot: Decreased range of motion. Swelling and tenderness present.        Feet:       Comments: Dorsalis pedis pulses 2+ bilaterally.     Neurological: She is alert and oriented to person, place, and time. No cranial nerve deficit.   Skin: Skin is warm.   Psychiatric: Her mood appears anxious. She is withdrawn. Thought content is paranoid and delusional.   Patient appears to be responding to external stimuli.  Unable to make eye contact for extended period of time. Periodically staring off into space stating that she is hearing things.  She is inattentive.         ED Course   Procedures  Labs Reviewed   COMPREHENSIVE METABOLIC PANEL - Abnormal       Result Value    Sodium 135 (*)     Potassium 3.1 (*)     Chloride 103      CO2 18 (*)     Glucose 98      BUN 16      Creatinine 1.1      Calcium 9.8      Protein Total 8.6 (*)     Albumin 4.6      Bilirubin Total 3.1 (*)     ALP 85      AST 42      ALT 19      Anion Gap 14      eGFR >60     URINALYSIS, REFLEX TO URINE CULTURE - Abnormal    Color, UA Red (*)     Appearance, UA Cloudy (*)     pH, UA 6.0      Spec Grav UA 1.025      Protein, UA 2+ (*)     Glucose, UA Negative      Ketones, UA 2+ (*)     Bilirubin, UA Negative      Blood, UA 3+ (*)     Nitrites, UA Negative      Urobilinogen, UA Negative      Leukocyte Esterase,  "UA 2+ (*)    DRUG SCREEN PANEL, URINE EMERGENCY - Abnormal    Benzodiazepine, Urine Negative      Methadone, Urine Negative      Cocaine, Urine Negative      Opiates, Urine Negative      Barbiturates, Urine Negative      Amphetamines, Urine Negative      THC Presumptive Positive (*)     Phencyclidine, Urine Negative      Urine Creatinine 324.9      Narrative:     This screen includes the following classes of drugs at the listed cut-off:     Benzodiazepines:        200 ng/ml   Methadone:              300 ng/ml   Cocaine metabolite:     300 ng/ml   Opiates:                300 ng/ml   Barbiturates:           200 ng/ml   Amphetamines:           1000 ng/ml   Marijuana metabs (THC): 50 ng/ml   Phencyclidine (PCP):    25 ng/ml     This is a screening test. If results do not correlate with clinical presentation, then a confirmatory send out test is advised.    This report is intended for use in clinical monitoring and management of patients. It is not intended for use in employment related drug testing."   ACETAMINOPHEN LEVEL - Abnormal    Acetaminophen Level <3.0 (*)    CBC WITH DIFFERENTIAL - Abnormal    WBC 16.33 (*)     RBC 4.31      HGB 14.4      HCT 40.7      MCV 94      MCH 33.4 (*)     MCHC 35.4      RDW 13.9      Platelet Count 307      MPV 9.6      Nucleated RBC 0      Neut % 80.1 (*)     Lymph % 9.9 (*)     Mono % 9.1      Eos % 0.1      Basophil % 0.3      Imm Grans % 0.5      Neut # 13.09 (*)     Lymph # 1.62      Mono # 1.48 (*)     Eos # 0.01      Baso # 0.05      Imm Grans # 0.08 (*)    URINALYSIS MICROSCOPIC - Abnormal    RBC, UA >100 (*)     WBC, UA >100 (*)     WBC Clumps, UA Many (*)     Bacteria, UA Few (*)     Squamous Epithelial Cells, UA 17      Hyaline Casts, UA 0      Microscopic Comment       ALCOHOL,MEDICAL (ETHANOL) - Normal    Alcohol, Serum <10     CULTURE, URINE   CBC W/ AUTO DIFFERENTIAL    Narrative:     The following orders were created for panel order CBC auto differential.  Procedure     "                           Abnormality         Status                     ---------                               -----------         ------                     CBC with Differential[[id number]]       Abnormal            Final result                 Please view results for these tests on the individual orders.   GREY TOP URINE HOLD    Extra Tube Hold for add-ons.     POCT URINE PREGNANCY    POC Preg Test, Ur Negative       Acceptable Yes            Imaging Results              CT Lumbar Spine Without Contrast (Final result)  Result time 06/08/25 11:58:31      Final result by Jef Schaeffer DO (06/08/25 11:58:31)                   Impression:      Cervical spine: Mild moderate superior C7 Schmorl node deformity new from prior overall age indeterminate and may be recent.  Remaining cervical vertebral body heights and contours are within normal is without evidence for acute fracture line    Clinical correlation and further evaluation as warranted.    Lumbar spine: No evidence for acute fracture or traumatic subluxation of the lumbar spine.    See above for additional details.      Electronically signed by: Jef Schaeffer DO  Date:    06/08/2025  Time:    11:58               Narrative:    EXAMINATION:  CT CERVICAL SPINE WITHOUT CONTRAST; CT LUMBAR SPINE WITHOUT CONTRAST    CLINICAL HISTORY:  Neck trauma, midline tenderness (Age 16-64y);; Low back pain, trauma;    TECHNIQUE:  1.25 mm axial images of the cervical, and lumbar spine without contrast.  Coronal and sagittal reformatted imaging from the axial acquisition.    COMPARISON:  CT neck 02/20/2020    FINDINGS:  Cervical spine: Cervical sagittal alignment is slightly reversed with stepwise grade 1 anterolisthesis of C3 on C4 and C4 on C5.  There is a mild moderate superior endplate Schmorl node deformity within the C7 vertebral body which is new and may be recent overall age indeterminate.  There is no evidence for acute fracture line.  There is  rightward rotation of C1 on C2 which may be related to head positioning.  Evaluation of the central canal neural foramen limited by artifact from motion and beam hardening there is questioned bulging disc at C6/C7 without evidence for significant central canal or bony neural foraminal stenosis.    Lumbar spine: Lumbar sagittal alignment within normal limits.  Lumbar vertebral body heights and contours are within normal is without evidence for acute fracture or traumatic subluxation.  Allowing for CT technique there is small posterior disc osteophyte L4/L5 without significant central canal or bony neural foraminal stenosis                                       CT Cervical Spine Without Contrast (Final result)  Result time 06/08/25 11:58:31      Final result by Jef Schaeffer DO (06/08/25 11:58:31)                   Impression:      Cervical spine: Mild moderate superior C7 Schmorl node deformity new from prior overall age indeterminate and may be recent.  Remaining cervical vertebral body heights and contours are within normal is without evidence for acute fracture line    Clinical correlation and further evaluation as warranted.    Lumbar spine: No evidence for acute fracture or traumatic subluxation of the lumbar spine.    See above for additional details.      Electronically signed by: Jef Schaeffer DO  Date:    06/08/2025  Time:    11:58               Narrative:    EXAMINATION:  CT CERVICAL SPINE WITHOUT CONTRAST; CT LUMBAR SPINE WITHOUT CONTRAST    CLINICAL HISTORY:  Neck trauma, midline tenderness (Age 16-64y);; Low back pain, trauma;    TECHNIQUE:  1.25 mm axial images of the cervical, and lumbar spine without contrast.  Coronal and sagittal reformatted imaging from the axial acquisition.    COMPARISON:  CT neck 02/20/2020    FINDINGS:  Cervical spine: Cervical sagittal alignment is slightly reversed with stepwise grade 1 anterolisthesis of C3 on C4 and C4 on C5.  There is a mild moderate superior endplate  Schmorl node deformity within the C7 vertebral body which is new and may be recent overall age indeterminate.  There is no evidence for acute fracture line.  There is rightward rotation of C1 on C2 which may be related to head positioning.  Evaluation of the central canal neural foramen limited by artifact from motion and beam hardening there is questioned bulging disc at C6/C7 without evidence for significant central canal or bony neural foraminal stenosis.    Lumbar spine: Lumbar sagittal alignment within normal limits.  Lumbar vertebral body heights and contours are within normal is without evidence for acute fracture or traumatic subluxation.  Allowing for CT technique there is small posterior disc osteophyte L4/L5 without significant central canal or bony neural foraminal stenosis                                       CT Head Without Contrast (Final result)  Result time 06/08/25 11:41:05      Final result by Jef Schaeffer DO (06/08/25 11:41:05)                   Impression:      Allowing for distortion by motion artifacts there is no acute intracranial findings specifically without evidence for acute intracranial hemorrhage or evidence for large territory recent infarction.  Further evaluation with MRI as warranted clinically.      Electronically signed by: Jef Schaeffer DO  Date:    06/08/2025  Time:    11:41               Narrative:    EXAMINATION:  CT HEAD WITHOUT CONTRAST    CLINICAL HISTORY:  Head trauma, moderate-severe;    TECHNIQUE:  Multiple sequential 5 mm axial images of the head without contrast.  Coronal and sagittal reformatted imaging from the axial acquisition.    COMPARISON:  None    FINDINGS:  Study distorted by motion artifacts, within limits of the study there is no evidence for acute intracranial hemorrhage or sulcal effacement.  The ventricles are normal in size without hydrocephalus.  There is no midline shift or mass effect.  Visualized paranasal sinuses and mastoid air cells are  clear.                                       X-Ray Foot Complete Left (Final result)  Result time 06/08/25 10:31:45      Final result by Jim Watkins MD (06/08/25 10:31:45)                   Impression:      Please see above.      Electronically signed by: Jim Watkins  Date:    06/08/2025  Time:    10:31               Narrative:    EXAMINATION:  XR FOOT COMPLETE 3 VIEW LEFT    CLINICAL HISTORY:  .  Unspecified injury of left foot, initial encounter    TECHNIQUE:  AP, lateral and oblique views of the left foot were performed.    COMPARISON:  None    FINDINGS:  No fracture or dislocation.    Prominent plantar calcaneal spur.    Lisfranc articulation is congruent.    Cartilage spaces are maintained.    Mild diffuse soft tissue swelling about the midfoot and hindfoot.                                       X-Ray Ankle Complete Left (Final result)  Result time 06/08/25 10:29:12      Final result by Jim Watkins MD (06/08/25 10:29:12)                   Impression:      Please see above.      Electronically signed by: Jim Watkins  Date:    06/08/2025  Time:    10:29               Narrative:    EXAMINATION:  XR ANKLE COMPLETE 3 VIEW LEFT    CLINICAL HISTORY:  Unspecified injury of unspecified ankle, initial encounter    TECHNIQUE:  AP, lateral and oblique views of the left ankle were performed.    COMPARISON:  None    FINDINGS:  No fracture or dislocation.  Ankle mortise is symmetric.  Talar dome is maintained.  Mild diffuse soft tissue swelling about the visualized hindfoot.                                       Medications   cefTRIAXone injection 1 g (1 g Intravenous Given 6/8/25 1152)   ketorolac injection 15 mg (15 mg Intravenous Given 6/8/25 1154)   potassium chloride SA CR tablet 40 mEq (40 mEq Oral Given 6/8/25 1153)   haloperidol lactate injection 5 mg (5 mg Intramuscular Given 6/8/25 1153)   LORazepam injection 2 mg (2 mg Intramuscular Given 6/8/25 1153)   diphenhydrAMINE injection 50 mg (50 mg Intramuscular Given  6/8/25 1154)     Medical Decision Making  Patient presents to emergency room for bizarre behavior.  Vital signs stable.  Physical exam as stated above.    Differential Diagnosis includes, but is not limited to decompensated psychiatric disease (schizophrenia, bipolar disorder, major depression), excited delirium, medication noncompliance, substance abuse/withdrawal, intentional drug overdose, medication toxicity, APAP/ASA overdose, acute stress reaction, personality disorder, malingering, or metabolic derangement.  Lab work grossly unremarkable.  Patient with hyperkalemia, mild.  This was orally repleted in the emergency room.  She also has evidence of acute cystitis.  Rocephin given in the emergency room.  Otherwise unremarkable.  She does have history of psychosis, seen on chart review.  Previously seen in 2020 for inpatient psychiatric treatment.  Drug screen positive for marijuana.  Alcohol within normal limits.  Low suspicion for overdose or withdrawal at this time.  After fall, x-rays and CTs without evidence of fracture or dislocation.    After a brief and focused history and physical exam, I do not find any outward signs of medical catastophe or unstable condition. This type of evaluation does not include illnesses or conditions that may be latent or chronic in nature. For this type of evaluation, the patient will need good health care follow up with primary care. Patient is medically clear for inpatient psychiatric evaluation.     Problems Addressed:  Ankle injury, initial encounter: acute illness or injury  Bizarre behavior: acute illness or injury  Foot injury, left, initial encounter: acute illness or injury  Hallucinations: acute illness or injury  Psychosis, unspecified psychosis type: acute illness or injury    Amount and/or Complexity of Data Reviewed  External Data Reviewed: notes.     Details: Patient last admitted to a psychiatric facility in 06/2020 after SI.  Was found hanging by a wire in her  house.  It appears that patient was previously taking risperidone, Lexapro, and Zyprexa.  Labs: ordered. Decision-making details documented in ED Course.  Radiology: ordered. Decision-making details documented in ED Course.    Risk  Prescription drug management.  Diagnosis or treatment significantly limited by social determinants of health.  Risk Details: Comorbidities taken into consideration during the patient's evaluation and treatment include none.    Social determinants of health taken into consideration during development of our treatment plan include difficulty in obtaining follow-up, obtaining medications, health literacy, access to healthy options for preventative/conservative management, and/or support systems due to, but not limited to, transportation limitations, socioeconomic status, and environmental factors.                ED Course as of 06/08/25 1228   Sun Jun 08, 2025   1027 CBC auto differential(!)  CBC with leukocytosis to 16 3.  Hemoglobin stable.  Platelet count within normal limits. [BJ]   1027 WBC(!): 16.33  Seen on previous lab work done 4 years ago. [BJ]   1105 Urinalysis, Reflex to Urine Culture Urine, Clean Catch(!)  Urinalysis with 2+ leukocytes.  3+ occult blood.  2+ ketones. [BJ]   1105 Comprehensive metabolic panel(!)  CMP with decrease in sodium to 135.  Tip potassium of 3.1.  Bilirubin elevated 3.1.  LFTs within normal limits. [BJ]   1105 BILIRUBIN TOTAL(!): 3.1  Slightly elevated from previous. [BJ]   1105 Acetaminophen Level(!): <3.0  Within normal limits. [BJ]   1105 Alcohol, Serum: <10  Within normal limits. [BJ]   1106 hCG Qualitative, Urine: Negative  Urine pregnancy negative. [BJ]   1106 Urinalysis Microscopic(!)  Microscopic urinalysis with greater than 100 red and white blood cells.  Many white blood cell clumps.  Few bacteria.  Notable squamous. [BJ]   1108 X-Ray Ankle Complete Left  Independently reviewed and agree with radiology reading:  No fracture or dislocation. [BJ]    1108 X-Ray Foot Complete Left  Independently reviewed and agree with radiology reading:  No fracture or dislocation. [BJ]   1122 Drug screen panel, emergency(!)  Drug screening with presumptive marijuana. [BJ]   1122 Rocephin ordered due to UTI. [BJ]   1122 Potassium(!): 3.1  Will orally replete. [BJ]   1201 CT Head Without Contrast  Allowing for distortion by motion artifacts there is no acute intracranial findings specifically without evidence for acute intracranial hemorrhage or evidence for large territory recent infarction. [BJ]   1202 CT Cervical Spine Without Contrast  Cervical spine: Mild moderate superior C7 Schmorl node deformity new from prior overall age indeterminate and may be recent.  Remaining cervical vertebral body heights and contours are within normal is without evidence for acute fracture line     Clinical correlation and further evaluation as warranted.     Lumbar spine: No evidence for acute fracture or traumatic subluxation of the lumbar spine.     See above for additional details. [BJ]   1202 CT Lumbar Spine Without Contrast  Cervical spine: Mild moderate superior C7 Schmorl node deformity new from prior overall age indeterminate and may be recent.  Remaining cervical vertebral body heights and contours are within normal is without evidence for acute fracture line     Clinical correlation and further evaluation as warranted.     Lumbar spine: No evidence for acute fracture or traumatic subluxation of the lumbar spine.     See above for additional details. [BJ]   1226 Patient having auditory hallucinations.  Supervising MD placed orders for B52. [BJ]      ED Course User Index  [BJ] Melania Mcclain PA-C                           Clinical Impression:  Final diagnoses:  [S99.919A] Ankle injury, initial encounter  [S99.922A] Foot injury, left, initial encounter  [F29] Psychosis, unspecified psychosis type (Primary)  [R46.2] Bizarre behavior  [R44.3] Hallucinations          ED Disposition  Condition    Transfer to [facility] Stable          ED Prescriptions    None       Follow-up Information    None         This note was partially created using [employer] Voice Recognition software. Typographical and content errors may occur with this process. While efforts are made to detect and correct such errors, in some cases errors will persist. For this reason, wording in this document should be considered in the proper context and not strictly verbatim.          [1]   Social History  Tobacco Use    Smoking status: Some Days     Current packs/day: 0.50     Types: Cigarettes    Smokeless tobacco: Current   Substance Use Topics    Alcohol use: Yes     Alcohol/week: 0.8 standard drinks of alcohol     Types: 1 Standard drinks or equivalent per week     Comment: drink about 4/5 cans a week    Drug use: No        Melania Mcclain PA-C  06/08/25 1596
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
## 2025-06-09 LAB — BACTERIA UR CULT: NORMAL

## 2025-06-09 NOTE — TELEPHONE ENCOUNTER
By mistake, prescription was not sent to pharmacy for UTI. Patient given Rocephin while in the ER. Called [facility] and spoke with Rehan, nurse, who states that patient will be placed on Keflex by team in the morning.

## 2025-06-11 PROBLEM — F19.959 SUBSTANCE-INDUCED PSYCHOTIC DISORDER: Status: ACTIVE | Noted: 2025-06-11

## 2025-06-11 PROBLEM — F12.10 MILD TETRAHYDROCANNABINOL (THC) ABUSE: Status: ACTIVE | Noted: 2025-06-11
